# Patient Record
Sex: FEMALE | Race: WHITE | NOT HISPANIC OR LATINO | Employment: OTHER | URBAN - METROPOLITAN AREA
[De-identification: names, ages, dates, MRNs, and addresses within clinical notes are randomized per-mention and may not be internally consistent; named-entity substitution may affect disease eponyms.]

---

## 2024-05-04 ENCOUNTER — HOSPITAL ENCOUNTER (EMERGENCY)
Facility: CLINIC | Age: 39
Discharge: HOME | End: 2024-05-04
Attending: EMERGENCY MEDICINE | Admitting: EMERGENCY MEDICINE
Payer: COMMERCIAL

## 2024-05-04 ENCOUNTER — APPOINTMENT (OUTPATIENT)
Dept: GENERAL RADIOLOGY | Facility: CLINIC | Age: 39
End: 2024-05-04
Attending: EMERGENCY MEDICINE
Payer: COMMERCIAL

## 2024-05-04 VITALS
TEMPERATURE: 97.9 F | OXYGEN SATURATION: 100 % | HEART RATE: 82 BPM | SYSTOLIC BLOOD PRESSURE: 161 MMHG | RESPIRATION RATE: 18 BRPM | DIASTOLIC BLOOD PRESSURE: 101 MMHG

## 2024-05-04 DIAGNOSIS — S61.452A CAT BITE OF LEFT HAND, INITIAL ENCOUNTER: ICD-10-CM
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
## 2024-05-04 PROCEDURE — 96372 THER/PROPH/DIAG INJ SC/IM: CPT | Performed by: EMERGENCY MEDICINE

## 2024-05-04 PROCEDURE — 90471 IMMUNIZATION ADMIN: CPT | Performed by: EMERGENCY MEDICINE

## 2024-05-04 PROCEDURE — 73130 X-RAY EXAM OF HAND: CPT | Mod: RT

## 2024-05-04 PROCEDURE — 73130 X-RAY EXAM OF HAND: CPT | Mod: 26 | Performed by: RADIOLOGY

## 2024-05-04 PROCEDURE — 99284 EMERGENCY DEPT VISIT MOD MDM: CPT | Mod: 25 | Performed by: EMERGENCY MEDICINE

## 2024-05-04 PROCEDURE — 90715 TDAP VACCINE 7 YRS/> IM: CPT | Performed by: EMERGENCY MEDICINE

## 2024-05-04 PROCEDURE — 99284 EMERGENCY DEPT VISIT MOD MDM: CPT | Mod: GC | Performed by: EMERGENCY MEDICINE

## 2024-05-04 PROCEDURE — 250N000011 HC RX IP 250 OP 636: Performed by: EMERGENCY MEDICINE

## 2024-05-04 RX ORDER — IBUPROFEN 600 MG/1
600 TABLET, FILM COATED ORAL EVERY 6 HOURS PRN
Qty: 30 TABLET | Refills: 0 | Status: SHIPPED | OUTPATIENT
Start: 2024-05-04

## 2024-05-04 RX ORDER — KETOROLAC TROMETHAMINE 30 MG/ML
30 INJECTION, SOLUTION INTRAMUSCULAR; INTRAVENOUS ONCE
Status: COMPLETED | OUTPATIENT
Start: 2024-05-04 | End: 2024-05-04

## 2024-05-04 RX ADMIN — KETOROLAC TROMETHAMINE 30 MG: 30 INJECTION, SOLUTION INTRAMUSCULAR at 17:15

## 2024-05-04 RX ADMIN — CLOSTRIDIUM TETANI TOXOID ANTIGEN (FORMALDEHYDE INACTIVATED), CORYNEBACTERIUM DIPHTHERIAE TOXOID ANTIGEN (FORMALDEHYDE INACTIVATED), BORDETELLA PERTUSSIS TOXOID ANTIGEN (GLUTARALDEHYDE INACTIVATED), BORDETELLA PERTUSSIS FILAMENTOUS HEMAGGLUTININ ANTIGEN (FORMALDEHYDE INACTIVATED), BORDETELLA PERTUSSIS PERTACTIN ANTIGEN, AND BORDETELLA PERTUSSIS FIMBRIAE 2/3 ANTIGEN 0.5 ML: 5; 2; 2.5; 5; 3; 5 INJECTION, SUSPENSION INTRAMUSCULAR at 17:18

## 2024-05-04 ASSESSMENT — ACTIVITIES OF DAILY LIVING (ADL)
ADLS_ACUITY_SCORE: 35
ADLS_ACUITY_SCORE: 35

## 2024-05-04 NOTE — DISCHARGE INSTRUCTIONS
TODAY'S VISIT:  You were seen today for cat bite   -   - If you had any labs or imaging/radiology tests performed today, you should also discuss these tests with your usual provider.     FOLLOW-UP:  Please make an appointment to follow up with:  - Your Primary Care Provider. If you do not have a PCP, please call the [facility] (phone: [phone] for an appointment    - Have your provider review the results from today's visit with you again to make sure no further follow-up or additional testing is needed based on those results.     PRESCRIPTIONS / MEDICATIONS:  - Augmentin  - You may take Ibuprofen and/or Tylenol as needed for pain. You can take 600 mg of Ibuprofen every 6 hours and 1 g Tylenol every 6 hours. It may help to alternate these, so you take something every 3 hours.     OTHER INSTRUCTIONS:  -  Applying ice to the affected area several times a day over the next 1-2 days may also help with pain and swelling  - elevate your hand as much as possible    RETURN TO THE EMERGENCY DEPARTMENT  Return to the Emergency Department at any time for any new or worsening symptoms or any concerns.

## 2024-05-04 NOTE — ED PROVIDER NOTES
ED Provider Note  [facility]      History     Chief Complaint   Patient presents with    Cat Bite     HPI  Benita Harris is a 38 year old female with pmhx of depression who presented to the ED on 5/4/2024 with cat bite to left hand/wrist.    Ms. Harris notes that her partner's cat bit her left hand/wrist as she was trying to save it from a pitbull attack. Her partner's cat is a three year old indoor cat and up to date with rabies vaccination. She received her last tetanus booster in 2015.     After the cat bite, she rinsed her hand with water and her partner sprayed it with anesthetic and then she came directly to the ED. Since the cat bite, her left wrist/hand has become swollen and painful. She is able to move all of her fingers, although she is having trouble flexing her thumb and moving her wrist due to the swelling. She is also noticing numbness over the base of her left thumb.     No other injuries reported    She doesn't have a history of bleeding or immunocompromise. She doesn't have any medication allergies.    Past Medical History  No past medical history on file.  No past surgical history on file.  amoxicillin-clavulanate (AUGMENTIN) 875-125 MG tablet  ibuprofen (ADVIL/MOTRIN) 600 MG tablet      No Known Allergies  Family History  No family history on file.  Social History          A medically appropriate review of systems was performed with pertinent positives and negatives noted in the HPI, and all other systems negative.    Physical Exam   BP: (!) 161/101  Pulse: 82  Temp: 97.9  F (36.6  C)  Resp: 18  SpO2: 100 %  Physical Exam  HENT:      Head: Normocephalic and atraumatic.      Nose: Nose normal. No congestion or rhinorrhea.      Mouth/Throat:      Mouth: Mucous membranes are moist.   Eyes:      Extraocular Movements: Extraocular movements intact.      Conjunctiva/sclera: Conjunctivae normal.      Pupils: Pupils are equal, round, and reactive to light.   Cardiovascular:       Rate and Rhythm: Normal rate.   Pulmonary:      Effort: Pulmonary effort is normal. No respiratory distress.   Abdominal:      General: Abdomen is flat.      Palpations: Abdomen is soft.   Musculoskeletal:      Left hand: Swelling (of left wrist with limited ROM due to pain/swelling, left thumb with limited flexion - extension of thumb intact) and laceration (6 punctate cat bite marks across left wrist and base of left thumb) present.      Comments: Distally neurovascularly intact. Minimal active bleeding   Skin:     General: Skin is warm and dry.   Neurological:      Mental Status: She is alert.       ED Course, Procedures, & Data      Procedures              Results for orders placed or performed during the hospital encounter of 05/04/24   XR Hand Port Right G/E 3 Views     Status: None    Narrative    EXAM: XR HAND PORT RIGHT G/E 3 VIEWS  LOCATION: [facility]  DATE: 5/4/2024    INDICATION: swelling, cat bite  COMPARISON: None.      Impression    IMPRESSION: Normal joint spaces and alignment. No fracture. No evidence of osteomyelitis. Soft tissue swelling involving the hand near the first metacarpal and the wrist. No radiopaque foreign body.     Medications   Tdap (tetanus-diphtheria-acell pertussis) (ADACEL) injection 0.5 mL (0.5 mLs Intramuscular $Given 5/4/24 4542)   ketorolac (TORADOL) injection 30 mg (30 mg Intramuscular $Given 5/4/24 4909)     Labs Ordered and Resulted from Time of ED Arrival to Time of ED Departure - No data to display  XR Hand Port Right G/E 3 Views   Final Result   IMPRESSION: Normal joint spaces and alignment. No fracture. No evidence of osteomyelitis. Soft tissue swelling involving the hand near the first metacarpal and the wrist. No radiopaque foreign body.        Assessment & Plan    Benita Harris is a 38 year old female with PMHx of depression who presented to the ED on 5/4/2024 with new cat bite to left hand/wrist.    Exam demonstrates six  punctate bite marks across left wrist and base of thumb with serosanguinous drainage, as well as soft tissue swelling of left wrist and base of left thumb with impaired movement of wrist and impaired flexion of thumb due to pain and swelling (pt is able to flex thumb but not fully). No difficulty with extension of fingers. No bleeding observed. No pus drainage, warmth or erythema of wrist observed. Xray of wrist/hand demonstrates soft tissue swelling without imbedded teeth/other foreign body, fracture, or evidence of osteomyelitis.     Cleansed wounds, updated tetanus booster, treated pain with IV Toradol 15 mg once. Will discharge with 600 mg ibuprofen and 7 day course of Augmentin. Discussed recommendations to return with development of pus drainage, erythema and warmth over wrist. Plan to follow-up with PCP in 7 days.     I have reviewed the nursing notes. I have reviewed the findings, diagnosis, plan and need for follow up with the patient.    New Prescriptions    AMOXICILLIN-CLAVULANATE (AUGMENTIN) 875-125 MG TABLET    Take 1 tablet by mouth 2 times daily for 7 days    IBUPROFEN (ADVIL/MOTRIN) 600 MG TABLET    Take 1 tablet (600 mg) by mouth every 6 hours as needed       Final diagnoses:   Cat bite of left hand, initial encounter     MD Mindy Márquez MD PGY-1  Internal Medicine    --    ED Attending Physician Attestation    I Henrietta Juarez MD, cared for this patient with the Resident. I have performed a history and physical examination of the patient and discussed management with the resident. I reviewed the resident's documentation above and agree with the documented findings and plan of care.    Summary of HPI, PE, ED Course   Patient is a 38 year old female evaluated in the emergency department for cat bite. Exam and ED course notable for XR negative for foreign body or fracture. After the completion of care in the emergency department, the patient was discharged.    Critical  Care & Procedures  Not applicable.        Medical Decision Making  The patient's presentation was of low complexity (an acute and uncomplicated illness or injury).    The patient's evaluation involved:  review of external note(s) from 1 sources ([facility])  ordering and/or review of 1 test(s) in this encounter (XR)  independent interpretation of testing performed by another health professional (XR - agree with radiology reads)    The patient's management necessitated moderate risk (prescription drug management including medications given in the ED) and moderate risk (a decision regarding minor procedure (burn/wound care) with risk factors of none).          Henrietta Juarez MD  Emergency Medicine     [facility] EMERGENCY DEPARTMENT  5/4/2024     Henrietta Juarez MD  05/04/24 4003

## 2024-05-04 NOTE — ED TRIAGE NOTES
"Pt came ambulatory crying and tearful a cat bite on right hand. Feels numbness, pain. \" I think I'm in shock\"  Denied chest pain ,SOB.   BP (!) 161/101   Pulse 82   Temp 97.9  F (36.6  C) (Oral)   Resp 18   SpO2 100%          "

## 2024-05-06 ENCOUNTER — HOSPITAL ENCOUNTER (OUTPATIENT)
Facility: CLINIC | Age: 39
Setting detail: OBSERVATION
Discharge: HOME | End: 2024-05-07
Attending: EMERGENCY MEDICINE | Admitting: FAMILY MEDICINE
Payer: COMMERCIAL

## 2024-05-06 ENCOUNTER — ANESTHESIA (OUTPATIENT)
Dept: MEDSURG UNIT | Facility: CLINIC | Age: 39
End: 2024-05-06
Payer: COMMERCIAL

## 2024-05-06 ENCOUNTER — APPOINTMENT (OUTPATIENT)
Dept: CT IMAGING | Facility: CLINIC | Age: 39
End: 2024-05-06
Attending: EMERGENCY MEDICINE
Payer: COMMERCIAL

## 2024-05-06 ENCOUNTER — ANESTHESIA EVENT (OUTPATIENT)
Dept: MEDSURG UNIT | Facility: CLINIC | Age: 39
End: 2024-05-06
Payer: COMMERCIAL

## 2024-05-06 DIAGNOSIS — S61.451A CAT BITE OF RIGHT HAND, INITIAL ENCOUNTER: ICD-10-CM
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
## 2024-05-06 DIAGNOSIS — M65.949 TENOSYNOVITIS OF HAND: Primary | ICD-10-CM

## 2024-05-06 PROBLEM — F33.42 RECURRENT MAJOR DEPRESSIVE DISORDER, IN FULL REMISSION (H): Status: ACTIVE | Noted: 2020-11-27

## 2024-05-06 PROBLEM — F41.1 GAD (GENERALIZED ANXIETY DISORDER): Status: ACTIVE | Noted: 2020-11-27

## 2024-05-06 PROBLEM — F41.0 PANIC DISORDER: Status: ACTIVE | Noted: 2020-11-27

## 2024-05-06 PROBLEM — A60.04 HERPES SIMPLEX VULVOVAGINITIS: Status: ACTIVE | Noted: 2020-11-27

## 2024-05-06 LAB
ANION GAP SERPL CALCULATED.3IONS-SCNC: 10 MMOL/L (ref 7–15)
BASOPHILS # BLD AUTO: 0 10E3/UL (ref 0–0.2)
BASOPHILS NFR BLD AUTO: 0 %
BUN SERPL-MCNC: 11.2 MG/DL (ref 6–20)
CALCIUM SERPL-MCNC: 8.6 MG/DL (ref 8.6–10)
CHLORIDE SERPL-SCNC: 104 MMOL/L (ref 98–107)
CREAT SERPL-MCNC: 0.57 MG/DL (ref 0.51–0.95)
CRP SERPL-MCNC: 36.9 MG/L
DEPRECATED HCO3 PLAS-SCNC: 22 MMOL/L (ref 22–29)
EGFRCR SERPLBLD CKD-EPI 2021: >90 ML/MIN/1.73M2
EOSINOPHIL # BLD AUTO: 0.1 10E3/UL (ref 0–0.7)
EOSINOPHIL NFR BLD AUTO: 2 %
ERYTHROCYTE [DISTWIDTH] IN BLOOD BY AUTOMATED COUNT: 13 % (ref 10–15)
ERYTHROCYTE [SEDIMENTATION RATE] IN BLOOD BY WESTERGREN METHOD: 9 MM/HR (ref 0–20)
GLUCOSE SERPL-MCNC: 95 MG/DL (ref 70–99)
HCG UR QL: NEGATIVE
HCT VFR BLD AUTO: 41.3 % (ref 35–47)
HGB BLD-MCNC: 13.7 G/DL (ref 11.7–15.7)
IMM GRANULOCYTES # BLD: 0 10E3/UL
IMM GRANULOCYTES NFR BLD: 0 %
INR PPP: 1.02 (ref 0.85–1.15)
LACTATE SERPL-SCNC: 0.6 MMOL/L (ref 0.7–2)
LYMPHOCYTES # BLD AUTO: 1.5 10E3/UL (ref 0.8–5.3)
LYMPHOCYTES NFR BLD AUTO: 16 %
MCH RBC QN AUTO: 28.8 PG (ref 26.5–33)
MCHC RBC AUTO-ENTMCNC: 33.2 G/DL (ref 31.5–36.5)
MCV RBC AUTO: 87 FL (ref 78–100)
MONOCYTES # BLD AUTO: 0.7 10E3/UL (ref 0–1.3)
MONOCYTES NFR BLD AUTO: 8 %
NEUTROPHILS # BLD AUTO: 7.2 10E3/UL (ref 1.6–8.3)
NEUTROPHILS NFR BLD AUTO: 74 %
NRBC # BLD AUTO: 0 10E3/UL
NRBC BLD AUTO-RTO: 0 /100
PLATELET # BLD AUTO: 229 10E3/UL (ref 150–450)
POTASSIUM SERPL-SCNC: 3.8 MMOL/L (ref 3.4–5.3)
RBC # BLD AUTO: 4.76 10E6/UL (ref 3.8–5.2)
SODIUM SERPL-SCNC: 136 MMOL/L (ref 135–145)
WBC # BLD AUTO: 9.6 10E3/UL (ref 4–11)

## 2024-05-06 PROCEDURE — 250N000013 HC RX MED GY IP 250 OP 250 PS 637: Performed by: PHYSICIAN ASSISTANT

## 2024-05-06 PROCEDURE — 250N000011 HC RX IP 250 OP 636: Performed by: EMERGENCY MEDICINE

## 2024-05-06 PROCEDURE — 96368 THER/DIAG CONCURRENT INF: CPT | Performed by: EMERGENCY MEDICINE

## 2024-05-06 PROCEDURE — 73201 CT UPPER EXTREMITY W/DYE: CPT | Mod: 26 | Performed by: RADIOLOGY

## 2024-05-06 PROCEDURE — G0378 HOSPITAL OBSERVATION PER HR: HCPCS

## 2024-05-06 PROCEDURE — 96365 THER/PROPH/DIAG IV INF INIT: CPT | Mod: 59 | Performed by: EMERGENCY MEDICINE

## 2024-05-06 PROCEDURE — 85025 COMPLETE CBC W/AUTO DIFF WBC: CPT | Performed by: EMERGENCY MEDICINE

## 2024-05-06 PROCEDURE — 99222 1ST HOSP IP/OBS MODERATE 55: CPT | Mod: AI

## 2024-05-06 PROCEDURE — 87040 BLOOD CULTURE FOR BACTERIA: CPT | Performed by: EMERGENCY MEDICINE

## 2024-05-06 PROCEDURE — 96361 HYDRATE IV INFUSION ADD-ON: CPT

## 2024-05-06 PROCEDURE — 250N000013 HC RX MED GY IP 250 OP 250 PS 637: Performed by: EMERGENCY MEDICINE

## 2024-05-06 PROCEDURE — 81025 URINE PREGNANCY TEST: CPT | Performed by: EMERGENCY MEDICINE

## 2024-05-06 PROCEDURE — 36415 COLL VENOUS BLD VENIPUNCTURE: CPT | Performed by: STUDENT IN AN ORGANIZED HEALTH CARE EDUCATION/TRAINING PROGRAM

## 2024-05-06 PROCEDURE — 99285 EMERGENCY DEPT VISIT HI MDM: CPT | Mod: 25 | Performed by: EMERGENCY MEDICINE

## 2024-05-06 PROCEDURE — 87040 BLOOD CULTURE FOR BACTERIA: CPT | Performed by: PHYSICIAN ASSISTANT

## 2024-05-06 PROCEDURE — 99207 PR APP CREDIT; MD BILLING SHARED VISIT: CPT | Mod: FS | Performed by: STUDENT IN AN ORGANIZED HEALTH CARE EDUCATION/TRAINING PROGRAM

## 2024-05-06 PROCEDURE — 250N000011 HC RX IP 250 OP 636: Performed by: STUDENT IN AN ORGANIZED HEALTH CARE EDUCATION/TRAINING PROGRAM

## 2024-05-06 PROCEDURE — 99291 CRITICAL CARE FIRST HOUR: CPT | Performed by: EMERGENCY MEDICINE

## 2024-05-06 PROCEDURE — 36415 COLL VENOUS BLD VENIPUNCTURE: CPT | Performed by: PHYSICIAN ASSISTANT

## 2024-05-06 PROCEDURE — 36415 COLL VENOUS BLD VENIPUNCTURE: CPT | Performed by: EMERGENCY MEDICINE

## 2024-05-06 PROCEDURE — 96375 TX/PRO/DX INJ NEW DRUG ADDON: CPT

## 2024-05-06 PROCEDURE — 86140 C-REACTIVE PROTEIN: CPT | Performed by: EMERGENCY MEDICINE

## 2024-05-06 PROCEDURE — 258N000003 HC RX IP 258 OP 636: Performed by: EMERGENCY MEDICINE

## 2024-05-06 PROCEDURE — 96376 TX/PRO/DX INJ SAME DRUG ADON: CPT | Mod: 59

## 2024-05-06 PROCEDURE — 85652 RBC SED RATE AUTOMATED: CPT | Performed by: EMERGENCY MEDICINE

## 2024-05-06 PROCEDURE — 83605 ASSAY OF LACTIC ACID: CPT | Performed by: EMERGENCY MEDICINE

## 2024-05-06 PROCEDURE — 80048 BASIC METABOLIC PNL TOTAL CA: CPT | Performed by: EMERGENCY MEDICINE

## 2024-05-06 PROCEDURE — 99223 1ST HOSP IP/OBS HIGH 75: CPT | Mod: FS | Performed by: PHYSICIAN ASSISTANT

## 2024-05-06 PROCEDURE — 96376 TX/PRO/DX INJ SAME DRUG ADON: CPT

## 2024-05-06 PROCEDURE — 96366 THER/PROPH/DIAG IV INF ADDON: CPT | Performed by: EMERGENCY MEDICINE

## 2024-05-06 PROCEDURE — 85610 PROTHROMBIN TIME: CPT | Performed by: STUDENT IN AN ORGANIZED HEALTH CARE EDUCATION/TRAINING PROGRAM

## 2024-05-06 PROCEDURE — 73201 CT UPPER EXTREMITY W/DYE: CPT | Mod: RT

## 2024-05-06 PROCEDURE — 250N000011 HC RX IP 250 OP 636: Performed by: PHYSICIAN ASSISTANT

## 2024-05-06 RX ORDER — PIPERACILLIN SODIUM, TAZOBACTAM SODIUM 3; .375 G/15ML; G/15ML
3.38 INJECTION, POWDER, LYOPHILIZED, FOR SOLUTION INTRAVENOUS EVERY 6 HOURS
Status: DISCONTINUED | OUTPATIENT
Start: 2024-05-06 | End: 2024-05-07

## 2024-05-06 RX ORDER — DIPHENHYDRAMINE HCL 25 MG
25 CAPSULE ORAL EVERY 4 HOURS PRN
Status: DISCONTINUED | OUTPATIENT
Start: 2024-05-06 | End: 2024-05-07 | Stop reason: HOSPADM

## 2024-05-06 RX ORDER — CEFAZOLIN SODIUM 2 G/100ML
2 INJECTION, SOLUTION INTRAVENOUS SEE ADMIN INSTRUCTIONS
Status: CANCELLED | OUTPATIENT
Start: 2024-05-06

## 2024-05-06 RX ORDER — KETOROLAC TROMETHAMINE 30 MG/ML
30 INJECTION, SOLUTION INTRAMUSCULAR; INTRAVENOUS EVERY 6 HOURS PRN
Status: DISCONTINUED | OUTPATIENT
Start: 2024-05-06 | End: 2024-05-07

## 2024-05-06 RX ORDER — IOPAMIDOL 755 MG/ML
86 INJECTION, SOLUTION INTRAVASCULAR ONCE
Status: COMPLETED | OUTPATIENT
Start: 2024-05-06 | End: 2024-05-06

## 2024-05-06 RX ORDER — CEFAZOLIN SODIUM 2 G/100ML
2 INJECTION, SOLUTION INTRAVENOUS
Status: CANCELLED | OUTPATIENT
Start: 2024-05-06

## 2024-05-06 RX ORDER — CLINDAMYCIN PHOSPHATE 900 MG/50ML
900 INJECTION, SOLUTION INTRAVENOUS EVERY 8 HOURS
Status: DISCONTINUED | OUTPATIENT
Start: 2024-05-06 | End: 2024-05-06

## 2024-05-06 RX ORDER — ACETAMINOPHEN 325 MG/1
975 TABLET ORAL EVERY 6 HOURS PRN
Status: DISCONTINUED | OUTPATIENT
Start: 2024-05-06 | End: 2024-05-07 | Stop reason: HOSPADM

## 2024-05-06 RX ORDER — DIPHENHYDRAMINE HYDROCHLORIDE 50 MG/ML
25 INJECTION INTRAMUSCULAR; INTRAVENOUS ONCE
Status: COMPLETED | OUTPATIENT
Start: 2024-05-06 | End: 2024-05-06

## 2024-05-06 RX ORDER — OXYCODONE HYDROCHLORIDE 5 MG/1
5 TABLET ORAL EVERY 6 HOURS PRN
Status: DISCONTINUED | OUTPATIENT
Start: 2024-05-06 | End: 2024-05-07 | Stop reason: HOSPADM

## 2024-05-06 RX ORDER — PIPERACILLIN SODIUM, TAZOBACTAM SODIUM 3; .375 G/15ML; G/15ML
3.38 INJECTION, POWDER, LYOPHILIZED, FOR SOLUTION INTRAVENOUS ONCE
Status: COMPLETED | OUTPATIENT
Start: 2024-05-06 | End: 2024-05-06

## 2024-05-06 RX ORDER — CLINDAMYCIN PHOSPHATE 600 MG/50ML
600 INJECTION, SOLUTION INTRAVENOUS EVERY 8 HOURS
Status: DISCONTINUED | OUTPATIENT
Start: 2024-05-06 | End: 2024-05-06

## 2024-05-06 RX ORDER — LIDOCAINE 40 MG/G
CREAM TOPICAL
Status: DISCONTINUED | OUTPATIENT
Start: 2024-05-06 | End: 2024-05-07 | Stop reason: HOSPADM

## 2024-05-06 RX ORDER — OXYCODONE HYDROCHLORIDE 5 MG/1
5 TABLET ORAL
Status: DISCONTINUED | OUTPATIENT
Start: 2024-05-06 | End: 2024-05-06

## 2024-05-06 RX ADMIN — OXYCODONE HYDROCHLORIDE 5 MG: 5 TABLET ORAL at 17:00

## 2024-05-06 RX ADMIN — PIPERACILLIN AND TAZOBACTAM 3.38 G: 3; .375 INJECTION, POWDER, LYOPHILIZED, FOR SOLUTION INTRAVENOUS at 18:27

## 2024-05-06 RX ADMIN — VANCOMYCIN HYDROCHLORIDE 1500 MG: 10 INJECTION, POWDER, LYOPHILIZED, FOR SOLUTION INTRAVENOUS at 13:47

## 2024-05-06 RX ADMIN — SODIUM CHLORIDE 1000 ML: 9 INJECTION, SOLUTION INTRAVENOUS at 12:54

## 2024-05-06 RX ADMIN — IOPAMIDOL 86 ML: 755 INJECTION, SOLUTION INTRAVENOUS at 15:40

## 2024-05-06 RX ADMIN — OXYCODONE HYDROCHLORIDE 5 MG: 5 TABLET ORAL at 23:06

## 2024-05-06 RX ADMIN — PIPERACILLIN SODIUM AND TAZOBACTAM SODIUM 3.38 G: 3; .375 INJECTION, POWDER, LYOPHILIZED, FOR SOLUTION INTRAVENOUS at 12:48

## 2024-05-06 RX ADMIN — DIPHENHYDRAMINE HYDROCHLORIDE 25 MG: 50 INJECTION, SOLUTION INTRAMUSCULAR; INTRAVENOUS at 14:48

## 2024-05-06 RX ADMIN — OXYCODONE HYDROCHLORIDE 5 MG: 5 TABLET ORAL at 12:48

## 2024-05-06 ASSESSMENT — ACTIVITIES OF DAILY LIVING (ADL)
ADLS_ACUITY_SCORE: 35
ADLS_ACUITY_SCORE: 21
ADLS_ACUITY_SCORE: 35
ADLS_ACUITY_SCORE: 21
ADLS_ACUITY_SCORE: 35

## 2024-05-06 ASSESSMENT — COLUMBIA-SUICIDE SEVERITY RATING SCALE - C-SSRS
2. HAVE YOU ACTUALLY HAD ANY THOUGHTS OF KILLING YOURSELF IN THE PAST MONTH?: NO
6. HAVE YOU EVER DONE ANYTHING, STARTED TO DO ANYTHING, OR PREPARED TO DO ANYTHING TO END YOUR LIFE?: NO
1. IN THE PAST MONTH, HAVE YOU WISHED YOU WERE DEAD OR WISHED YOU COULD GO TO SLEEP AND NOT WAKE UP?: NO

## 2024-05-06 NOTE — CONSULTS
Orthopaedic Surgery Consultation    DATE OF SERVICE:  5/6/2024    This is a consultation requested by ED for concern for cellulitis R hand    CHIEF COMPLAINT:  R hand pain    HISTORY OBTAINED FROM: patient    HISTORY:  This is a 38 year old RHD female with PMH SP and MDD who presents with right hand pain following a cat bite.  Patient reports she was at the outdoor area of her apartment taking her friend's cat outside when 2 dogs attacked their cat and she tried to separate them.  She states that she was bit by the cat twice on the right hand and was not bit by the dogs. Discharged with 7 days augmentin and given tetanus.  Reports she has been taking her Augmentin as directed.  reports she continues to have right hand pain which is worsening, she reports that her swelling has worsened on the dorsum of her hand and wrist.  She reports that 3 of the cat bite puncture wounds have been expressing purulence, however have since scabbed over.  Reports she is now having some numbness to the right thenar eminence which is gradually worsened.    Reports her pain is mostly in the right thenar eminence as well as the dorsum of the right hand.    N.p.o. at 9 AM this morning.  Last drink 4 ounces water at 4:45 PM    Patients denies fever, chills, nausea, vomiting, changes in bowel/bladder habits. Denies upper respiratory symptoms, urinary symptoms, rashes.      PAST MEDICAL HISTORY:    No past medical history on file.    PAST SURGICAL HISTORY:    No past surgical history on file.    FAMILY HISTORY:  Reviewed with patient and is non-contributory.     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 Auth Provider Long Term End Date   amoxicillin-clavulanate (AUGMENTIN) 875-125 MG tablet Take 1 tablet by mouth 2 times daily for 7 days  "  Henrietta Juarez MD  5/11/24   ibuprofen (ADVIL/MOTRIN) 600 MG tablet Take 1 tablet (600 mg) by mouth every 6 hours as needed   Henrietta Juarez MD           REVIEW OF SYSTEMS:  An 11-point systems review was performed and negative except as noted in the HPI.      PHYSICAL EXAMINATION:    Vitals:    05/06/24 1211 05/06/24 1317   BP: 119/78    Pulse: 68    Resp: 16    Temp: 98.3  F (36.8  C)    TempSrc: Oral    SpO2: 99%    Weight:  63.5 kg (140 lb)   Height:  1.753 m (5' 9\")         Gen:  Awake and Alert, pleasant, interactive, no acute distress.    Psych:  Articulates and Communicates with a normal affect    HEENT:  Normal and atraumatic    Pulm:   Non-labored breathing at rest. Saturating well on RA.   CV:  RRR   Pelvis:   Stable to anterior and lateral compression  Extremities:    RUE:   No deformity, skin intact.  There are multiple scattered puncture wounds on the dorsum of the hand and wrist as well as forearm and the thenar eminence.  There is very slight erythema extending from the dorsum of the hand into the distal forearm.  Swelling extending from the dorsum of the hand and thenar eminence into the dorsal forearm.  Tender to palpation over the dorsum of the hand, thenar eminence, and less so over the dorsal forearm.  Pain with passive stretch of the index and middle fingers over the extensor tendons.  No active drainage at this time.  Fires FPL, EPL, EDC, FDS, FDP, IO.  Wrist range of motion is limited to 10 degrees of flexion 10 degrees of extension.     Non-tender to palpation over clavicle, shoulder, arm, elbow.    Normal ROM shoulder, elbow without pain. + FPL/EPL/intrinsics.     SILT over m/r/u distributions, though with focal decreased sensation over the thenar eminence.    Radial pulse palpable.     LUE:  No deformity, skin intact.     Non-tender to palpation over clavicle, shoulder, arm, elbow, forearm, wrist.     Normal ROM shoulder, elbow, wrist without pain. + FPL/EPL/intrinsics. " "    SILT over m/r/u distributions.     Radial pulse palpable.                       LABS/IMAGING:  Recent Labs   Lab Test 05/06/24  1240   HGB 13.7   WBC 9.6     Recent Labs   Lab Test 05/06/24  1240   CHLORIDE 104   CO2 22   BUN 11.2     No results for input(s): \"INR\", \"PROTIME\", \"PTT\" in the last 73200 hours.    Imaging:  Right wrist radiographs obtained on 5/6/2024 demonstrate no fracture or dislocation, no foreign body.  Soft tissue swelling present.    CT of the right hand obtained on 5/6/2024 demonstrates increased fluid present within the second and third extensor compartments.  Subcutaneous tissue swelling the dorsum of the hand and wrist.      IMPRESSION AND PLAN:  A 38 year old right-hand-dominant female with concern for right septic extensor tenosynovitis. This would be best treated with irrigation and debridement of the right upper extremity given the extent of involvement of the extensor tendon sheath and acute worsening despite antibiotics.     - Plan for OR: Irrigation and debridement of the right extremity.   - consent: pending   - preop labs: pending   - medical clearance: pending  - Anticoagulation: Hold after 8 PM  - Antibiotics/tetanus: Already receiving Vanco Zosyn.  Per primary.  - Xrays/imaging: No new imaging required  - Activity: As tolerated  - Weight Bearing: WBAT right upper extremity  - Pain control: per primary  - Diet: N.p.o. at MN   - Dispo: TBD  - Follow-up: TBD    Patient was discussed with on-call staff: Dr. Sutherland.    Jennifer Curry MD  Orthopaedic Surgery, PGY1   "

## 2024-05-06 NOTE — PLAN OF CARE
"  VS: /78 (BP Location: Left arm)   Pulse 72   Temp 98.2  F (36.8  C) (Oral)   Resp 18   Ht 1.753 m (5' 9\")   Wt 63.5 kg (140 lb)   SpO2 100%   BMI 20.67 kg/m       O2: Stable @ RA ,Denies SOB,Chest pain   Output: Continent of b/b   Activity: Independent in room   Skin: Bite marks/Swelling on R.arm   Pain: Pain on R.arm   CMS: AXOX4   Dressing: None   Diet: Regular diet,NPO to start at midnight for procedure tomorrow morning   LDA: LPIV infusing Abx   Equipment: Personal belongings   Plan: Continue with POC   Additional Info: NPO starting at midnight  CHG bath tonight and tomorrow morning  R.arm elevated on a pillow                             "

## 2024-05-06 NOTE — PLAN OF CARE
8MS ADMISSION    Patient admitted/transferred from [facility] via EMS for R.hand Cat bite @5:30pm  Upon arrival to the unit patient was oriented to room, unit, and call light. Patient s vital signs were obtained. Allergies reviewed and allergy band applied. Provider notified of patient s arrival on the unit. Adult AVS completed. Head to toe assessment completed. Education assessment completed. Care plan initiated.  Vital signs stable upon admission. Patient rates pain at 5 . Two person skin check completed with Nurse Angel.No Significant Skin Findings .   Plan:  Continue with plan of care. Notify provider with any concerns or changes in patient status.

## 2024-05-06 NOTE — ED NOTES
Patient called to alert staff that she has been experiencing continuous body itch . Noted since Vancomycin was started. Slight redness noted to  face, arms and head. Writer stops Vancomycin. Alerted Dr Thomas and pharmacist. Order received for benadryl 25 mg IV once  for itching and to restart vancomycin half of initial rate.. per pharmacy to start Vancomycin rate  100 ml/hr. No rasjh noted on b

## 2024-05-06 NOTE — ED NOTES
Transferred to UR 8 MS , report given to AVILA Hunt. Transported by [facility] ambulance

## 2024-05-06 NOTE — ED PROVIDER NOTES
"    [place] EMERGENCY DEPARTMENT ([facility])    5/06/24       ED PROVIDER NOTE     History     Chief Complaint   Patient presents with    Cat Bite     HPI  Benita Harris is a 38 year old female with history of anxiety who presents to the emergency department with concerns for worsening infection to a cat bite.  She was seen in the emergency department on 5/4/2024, 2 days ago, after sustaining a cat bite to her right hand.  She was seen by Dr. Boston who performed a portable x-ray of the right hand that was negative for radiopaque foreign bodies.  It did note some soft tissue swelling near the first metacarpal and the wrist.  She was given a dose of Toradol, tetanus booster and a prescription for Augmentin and ibuprofen.  She presents today with numbness and pain to her right hand and is concerned of worsening infection. Patient states her hand has progressively became more red and swollen. She is now noticing numbness under her right thumb. She denies recent fevers. She states her pain is much worse. She has noticed pus from the the bite wounds. She is right hand dominant.       Past Medical History  No past medical history on file.  No past surgical history on file.  amoxicillin-clavulanate (AUGMENTIN) 875-125 MG tablet  ibuprofen (ADVIL/MOTRIN) 600 MG tablet      No Known Allergies  Family History  No family history on file.  Social History          A medically appropriate review of systems was performed with pertinent positives and negatives noted in the HPI, and all other systems negative.    Physical Exam   BP: 119/78  Pulse: 68  Temp: 98.3  F (36.8  C)  Resp: 16  Height: 175.3 cm (5' 9\")  Weight: 63.5 kg (140 lb)  SpO2: 99 %  Physical Exam  Vitals and nursing note reviewed.   Constitutional:       General: She is not in acute distress.     Appearance: Normal appearance. She is well-developed. She is not ill-appearing or diaphoretic.   HENT:      Head: Normocephalic and atraumatic.      Nose: Nose " normal.      Mouth/Throat:      Mouth: Mucous membranes are moist.   Eyes:      General: No scleral icterus.     Conjunctiva/sclera: Conjunctivae normal.   Cardiovascular:      Rate and Rhythm: Normal rate.   Pulmonary:      Effort: Pulmonary effort is normal. No respiratory distress.      Breath sounds: No stridor.   Abdominal:      General: There is no distension.   Musculoskeletal:         General: Swelling and tenderness present. No deformity. Normal range of motion.      Right hand: Swelling and tenderness present. Normal capillary refill. Normal pulse.        Arms:       Cervical back: Normal range of motion and neck supple. No rigidity.      Comments: Significant soft tissue swelling over dorsum of right hand with faint erythema. Tender to palpation and pain with wrist movement. No palpable crepitus. Multiple crusted puncture wounds. No drainage.    Skin:     General: Skin is warm and dry.      Coloration: Skin is not jaundiced or pale.      Findings: Erythema present.   Neurological:      General: No focal deficit present.      Mental Status: She is alert and oriented to person, place, and time.   Psychiatric:         Mood and Affect: Mood normal.         Behavior: Behavior normal.              ED Course, Procedures, & Data      Procedures               Results for orders placed or performed during the hospital encounter of 05/06/24   Basic metabolic panel     Status: Normal   Result Value Ref Range    Sodium 136 135 - 145 mmol/L    Potassium 3.8 3.4 - 5.3 mmol/L    Chloride 104 98 - 107 mmol/L    Carbon Dioxide (CO2) 22 22 - 29 mmol/L    Anion Gap 10 7 - 15 mmol/L    Urea Nitrogen 11.2 6.0 - 20.0 mg/dL    Creatinine 0.57 0.51 - 0.95 mg/dL    GFR Estimate >90 >60 mL/min/1.73m2    Calcium 8.6 8.6 - 10.0 mg/dL    Glucose 95 70 - 99 mg/dL   Lactic acid whole blood     Status: Abnormal   Result Value Ref Range    Lactic Acid 0.6 (L) 0.7 - 2.0 mmol/L   CRP inflammation     Status: Abnormal   Result Value Ref  Range    CRP Inflammation 36.90 (H) <5.00 mg/L   Erythrocyte sedimentation rate auto     Status: Normal   Result Value Ref Range    Erythrocyte Sedimentation Rate 9 0 - 20 mm/hr   HCG qualitative urine (UPT)     Status: Normal   Result Value Ref Range    hCG Urine Qualitative Negative Negative   CBC with platelets and differential     Status: None   Result Value Ref Range    WBC Count 9.6 4.0 - 11.0 10e3/uL    RBC Count 4.76 3.80 - 5.20 10e6/uL    Hemoglobin 13.7 11.7 - 15.7 g/dL    Hematocrit 41.3 35.0 - 47.0 %    MCV 87 78 - 100 fL    MCH 28.8 26.5 - 33.0 pg    MCHC 33.2 31.5 - 36.5 g/dL    RDW 13.0 10.0 - 15.0 %    Platelet Count 229 150 - 450 10e3/uL    % Neutrophils 74 %    % Lymphocytes 16 %    % Monocytes 8 %    % Eosinophils 2 %    % Basophils 0 %    % Immature Granulocytes 0 %    NRBCs per 100 WBC 0 <1 /100    Absolute Neutrophils 7.2 1.6 - 8.3 10e3/uL    Absolute Lymphocytes 1.5 0.8 - 5.3 10e3/uL    Absolute Monocytes 0.7 0.0 - 1.3 10e3/uL    Absolute Eosinophils 0.1 0.0 - 0.7 10e3/uL    Absolute Basophils 0.0 0.0 - 0.2 10e3/uL    Absolute Immature Granulocytes 0.0 <=0.4 10e3/uL    Absolute NRBCs 0.0 10e3/uL   CBC with platelets differential     Status: None    Narrative    The following orders were created for panel order CBC with platelets differential.  Procedure                               Abnormality         Status                     ---------                               -----------         ------                     CBC with platelets and d...[[id number]]                      Final result                 Please view results for these tests on the individual orders.     Medications   oxyCODONE (ROXICODONE) tablet 5 mg (5 mg Oral $Given 5/6/24 1248)   vancomycin (VANCOCIN) 1,500 mg in 0.9% NaCl 250 mL intermittent infusion (1,500 mg Intravenous $New Bag 5/6/24 1347)   lidocaine 1 % 10 mL (has no administration in time range)   vancomycin (VANCOCIN) 1,250 mg in 0.9% NaCl 250 mL intermittent infusion  (has no administration in time range)   pharmacy alert - intermittent dosing (has no administration in time range)   lidocaine 1 % 0.1-1 mL (has no administration in time range)   lidocaine (LMX4) cream (has no administration in time range)   sodium chloride (PF) 0.9% PF flush 3 mL (3 mLs Intracatheter $Given 5/6/24 1449)   sodium chloride (PF) 0.9% PF flush 3 mL (3 mLs Intracatheter $Given 5/6/24 1448)   clindamycin (CLEOCIN) 900 mg in 50 mL D5W intermittent infusion (has no administration in time range)   piperacillin-tazobactam (ZOSYN) 3.375 g vial to attach to  mL bag (0 g Intravenous Stopped 5/6/24 1448)   sodium chloride 0.9% BOLUS 1,000 mL (1,000 mLs Intravenous $New Bag 5/6/24 1254)   diphenhydrAMINE (BENADRYL) injection 25 mg (25 mg Intravenous $Given 5/6/24 1448)     Labs Ordered and Resulted from Time of ED Arrival to Time of ED Departure   LACTIC ACID WHOLE BLOOD - Abnormal       Result Value    Lactic Acid 0.6 (*)    CRP INFLAMMATION - Abnormal    CRP Inflammation 36.90 (*)    BASIC METABOLIC PANEL - Normal    Sodium 136      Potassium 3.8      Chloride 104      Carbon Dioxide (CO2) 22      Anion Gap 10      Urea Nitrogen 11.2      Creatinine 0.57      GFR Estimate >90      Calcium 8.6      Glucose 95     ERYTHROCYTE SEDIMENTATION RATE AUTO - Normal    Erythrocyte Sedimentation Rate 9     HCG QUALITATIVE URINE - Normal    hCG Urine Qualitative Negative     CBC WITH PLATELETS AND DIFFERENTIAL    WBC Count 9.6      RBC Count 4.76      Hemoglobin 13.7      Hematocrit 41.3      MCV 87      MCH 28.8      MCHC 33.2      RDW 13.0      Platelet Count 229      % Neutrophils 74      % Lymphocytes 16      % Monocytes 8      % Eosinophils 2      % Basophils 0      % Immature Granulocytes 0      NRBCs per 100 WBC 0      Absolute Neutrophils 7.2      Absolute Lymphocytes 1.5      Absolute Monocytes 0.7      Absolute Eosinophils 0.1      Absolute Basophils 0.0      Absolute Immature Granulocytes 0.0       Absolute NRBCs 0.0     BLOOD CULTURE     CT Hand Right w Contrast    (Results Pending)          Critical care was performed.   Critical Care Addendum  My initial assessment, based on my review of nursing observations, review of vital signs, focused history, and physical exam, established a high suspicion that Benita Harris has sepsis with indication for early goal-directed therapy and rapidly spreading infected cat bite , which requires immediate intervention, and therefore she is critically ill.     After the initial assessment, the care team initiated multiple lab tests, initiated IV fluid administration, and consulted with ortho  to provide stabilization care. Due to the critical nature of this patient, I reassessed nursing observations, vital signs, physical exam, and neurologic status multiple times prior to her disposition.     Time also spent performing documentation, reviewing test results, discussion with consultants, and coordination of care.     Critical care time (excluding teaching time and procedures): 38 minutes.     Assessment & Plan    Benita Harris is a 38 year old female with history of anxiety who presents to the emergency department with concerns for worsening infection to a cat bite.       Ddx: infected cat bite, failure of outpatient wound ppx, deep space soft tissue infection    Patient with marked worsening of dorsal right hand swelling despite rx with augmentin. Cat is her own pet. Patient's xray reviewed and no FB noted. TDAP utd.     Obtain CT and cultures. Admit for vanc and zosyn. Given oxy for pain.     CRP 36.9. lactate nl. ESR nl. CBC nl.    2:50 PM Patient started getting itching on her scalp during vanc infusion. No rash. Will give IV benadryl and slow down infusion. Patient also noting proximal extension of redness, pain, and swelling up to mid shaft forearm since initial evaluation today. Wound margins marked. Added IV clindamycin. Ortho consulted and planning to do some bedside I&D  of bite wounds.      Hand off provided to medicine obs. Plan for transfer to [facility].    Patient signed out to Dr. Quinonez at shift change. Dispo pending Ortho consult and CT. Anticipate admission to [facility]. Please see addendum for results of work up and remaining management.          I have reviewed the nursing notes. I have reviewed the findings, diagnosis, plan and need for follow up with the patient.    New Prescriptions    No medications on file       Final diagnoses:   Cat bite of right hand, initial encounter   I, PETERSON PRABHAKAR, am serving as a trained medical scribe to document services personally performed by Elizabeth Thomas MD, based on the provider's statements to me.     IElizabeth MD, was physically present and have reviewed and verified the accuracy of this note documented by PETERSON PRABHAKAR.    Elizabeth Thomas MD   [facility] EMERGENCY DEPARTMENT  5/6/2024        Elizabeth Thomas MD  05/06/24 5947

## 2024-05-06 NOTE — H&P
[facility]    History and Physical - Hospitalist Service, GOLD TEAM        Date of Admission:  5/6/2024    Assessment & Plan      Benita Harris is a 38 year old female admitted on 5/6/2024. She has no significant past medical history. She was bit by her cat on 5/4/24 while rescuing it from a dog attack. Seen in ED at that time with XR notable for some swelling at first metacarpal and wrist, no radiopaque foreign body. Discharged on augmentin. Has had worsening swelling, pain and now some associated numbness and firmness at puncture site concerning for developing abscess with risk for joint infection. Admitted for IV abx and Orthopedics evaluation.     Patient being admitted on [place] with transfer to [facility] to [place].     Soft tissue infection secondary to cat bite  Initially on augmentin with apparent treatment failure and progression of swelling, pain. Developed some purulent drainage and now has some numbness to her thenar eminence of right hand with swelling progressing into her hand. Tetanus was updated in ED on 5/4. Mildly elevated CRP, WBC and lactic wnl. No current systemic symptoms. Started on vancomycin and zosyn in the ED. Did develop itching with vanco - infusion slowed and received IV benadryl per ED provider.   - Orthopedics consulted   - Continue vancomcyin, zosyn 3.375g q6h  - Stop clindamycin   - Oral benadryl for pre-med/itching with further vanco administrations   - CT wrist w/contrast   - Pending blood culture x2  - Trend CBC, CRP  - Pain: tylenol, toradol, oxycodone PRN        Diet: Combination Diet Regular Diet Adult  DVT Prophylaxis: Low Risk/Ambulatory with no VTE prophylaxis indicated  Velez Catheter: Not present  Lines: None     Cardiac Monitoring: None  Code Status: Full Code    Clinically Significant Risk Factors Present on Admission                                  Disposition Plan     Medically Ready for Discharge: Anticipated  in 2-4 Days         The patient's care was discussed with the Attending Physician, Dr. Santiago and Patient.    Linda Kerr PA-C  Hospitalist Service, [facility]  Securely message with [employer] (more info)  Text page via [facility] Paging/Directory   See signed in provider for up to date coverage information    ______________________________________________________________________    Chief Complaint   Cat bite    History is obtained from the patient    History of Present Illness   Benita Harris is a 38 year old female who presented to the ED with a cat bit she got on Saturday. Had been outside with her cat in a pet area. Neighbor came in with 2 unleashed dogs and allowed them into the area and they went to attack her cat. She pulled the cat away from the dogs and cat bit her. She was seen in our ED on Saturday and discharged on augmentin. Developed worsening swelling yesterday into today. Has developed some numbness around her thumb. Swelling leading to decreased flexion/extension of her wrist. Has some firmness near the puncture site. Did have some purulence yesterday. Denies any fevers, chills. Otherwise in usual state of health. No significant past medical history. No known drug allergies. Did get itchy with vancomycin administration - rate of fusion slowed and given IV benadryl. Did note dislike for feeling of IV benadryl. Pain well controlled with orals.       Past Medical History    No past medical history on file.    Past Surgical History   No past surgical history on file.    Prior to Admission Medications   Prior to Admission Medications   Prescriptions Last Dose Informant Patient Reported? Taking?   amoxicillin-clavulanate (AUGMENTIN) 875-125 MG tablet   No No   Sig: Take 1 tablet by mouth 2 times daily for 7 days   ibuprofen (ADVIL/MOTRIN) 600 MG tablet   No No   Sig: Take 1 tablet (600 mg) by mouth every 6 hours as needed      Facility-Administered  Medications: None        Review of Systems    The 10 point Review of Systems is negative other than noted in the HPI or here.      Physical Exam   Vital Signs: Temp: 98.3  F (36.8  C) Temp src: Oral BP: 119/78 Pulse: 68   Resp: 16 SpO2: 99 % O2 Device: None (Room air)    Weight: 140 lbs 0 oz    General Appearance: Awake. Alert and oriented x4. Sitting up in bed. No acute distress.   Eyes: Normal lids. Anicteric sclera. Pupils equal and round.   HEENT: Normocephalic, atraumatic. Nares patent. Mucous membranes moist.   Respiratory: Normal work of breathing on room air. Lungs CTAB. No wheezes.   Cardiovascular: RRR. S1, S2. No murmurs.   GI: Abdomen non-distended. Normoactive. Soft, non-tender.   Lymph/Hematologic: No abnormal or excessive bruising on exposed skin.   Skin: Warm, dry. Right hand with obvious cat bite puncture on wrist with some adjacent scratches. No current purulent drainage. Some mild erythema but no obvious warmth or cellulitis. Does have some firmness directly adjacent to the puncture site concerning for abscess formation with swelling extending up into her dorsal and palmar hand.  Musculoskeletal: Moves all extremities equally. Swelling of right wrist joint.   Neurologic: No focal deficits.     Medical Decision Making       75 MINUTES SPENT BY ME on the date of service doing chart review, history, exam, documentation & further activities per the note.      Data     I have personally reviewed the following data over the past 24 hrs:    9.6  \   13.7   / 229     136 104 11.2 /  95   3.8 22 0.57 \     Procal: N/A CRP: 36.90 (H) Lactic Acid: 0.6 (L)         Imaging results reviewed over the past 24 hrs:   No results found for this or any previous visit (from the past 24 hour(s)).

## 2024-05-07 ENCOUNTER — TELEPHONE (OUTPATIENT)
Dept: ORTHOPEDICS | Facility: CLINIC | Age: 39
End: 2024-05-07
Payer: COMMERCIAL

## 2024-05-07 ENCOUNTER — ANESTHESIA EVENT (OUTPATIENT)
Dept: SURGERY | Facility: CLINIC | Age: 39
End: 2024-05-07
Payer: COMMERCIAL

## 2024-05-07 ENCOUNTER — ANESTHESIA (OUTPATIENT)
Dept: SURGERY | Facility: CLINIC | Age: 39
End: 2024-05-07
Payer: COMMERCIAL

## 2024-05-07 VITALS
RESPIRATION RATE: 18 BRPM | DIASTOLIC BLOOD PRESSURE: 55 MMHG | TEMPERATURE: 98 F | HEIGHT: 69 IN | SYSTOLIC BLOOD PRESSURE: 96 MMHG | HEART RATE: 68 BPM | WEIGHT: 140 LBS | BODY MASS INDEX: 20.73 KG/M2 | OXYGEN SATURATION: 98 %

## 2024-05-07 PROBLEM — M65.949 TENOSYNOVITIS OF HAND: Status: ACTIVE | Noted: 2024-05-07

## 2024-05-07 LAB
ALBUMIN SERPL BCG-MCNC: 3.8 G/DL (ref 3.5–5.2)
ALP SERPL-CCNC: 53 U/L (ref 40–150)
ALT SERPL W P-5'-P-CCNC: 9 U/L (ref 0–50)
ANION GAP SERPL CALCULATED.3IONS-SCNC: 9 MMOL/L (ref 7–15)
AST SERPL W P-5'-P-CCNC: 9 U/L (ref 0–45)
BILIRUB SERPL-MCNC: 0.2 MG/DL
BUN SERPL-MCNC: 10.6 MG/DL (ref 6–20)
CALCIUM SERPL-MCNC: 8.2 MG/DL (ref 8.6–10)
CHLORIDE SERPL-SCNC: 104 MMOL/L (ref 98–107)
CREAT SERPL-MCNC: 0.74 MG/DL (ref 0.51–0.95)
CRP SERPL-MCNC: 26.51 MG/L
DEPRECATED HCO3 PLAS-SCNC: 25 MMOL/L (ref 22–29)
EGFRCR SERPLBLD CKD-EPI 2021: >90 ML/MIN/1.73M2
ERYTHROCYTE [DISTWIDTH] IN BLOOD BY AUTOMATED COUNT: 13 % (ref 10–15)
GLUCOSE SERPL-MCNC: 77 MG/DL (ref 70–99)
GRAM STAIN RESULT: NORMAL
HCT VFR BLD AUTO: 37.9 % (ref 35–47)
HGB BLD-MCNC: 12.6 G/DL (ref 11.7–15.7)
MCH RBC QN AUTO: 28.5 PG (ref 26.5–33)
MCHC RBC AUTO-ENTMCNC: 33.2 G/DL (ref 31.5–36.5)
MCV RBC AUTO: 86 FL (ref 78–100)
PLATELET # BLD AUTO: 200 10E3/UL (ref 150–450)
POTASSIUM SERPL-SCNC: 3.9 MMOL/L (ref 3.4–5.3)
PROT SERPL-MCNC: 5.8 G/DL (ref 6.4–8.3)
RBC # BLD AUTO: 4.42 10E6/UL (ref 3.8–5.2)
SODIUM SERPL-SCNC: 138 MMOL/L (ref 135–145)
WBC # BLD AUTO: 7.8 10E3/UL (ref 4–11)

## 2024-05-07 PROCEDURE — 360N000075 HC SURGERY LEVEL 2, PER MIN: Performed by: ORTHOPAEDIC SURGERY

## 2024-05-07 PROCEDURE — 272N000001 HC OR GENERAL SUPPLY STERILE: Performed by: ORTHOPAEDIC SURGERY

## 2024-05-07 PROCEDURE — 999N000141 HC STATISTIC PRE-PROCEDURE NURSING ASSESSMENT: Performed by: ORTHOPAEDIC SURGERY

## 2024-05-07 PROCEDURE — 87205 SMEAR GRAM STAIN: CPT | Performed by: ORTHOPAEDIC SURGERY

## 2024-05-07 PROCEDURE — 96376 TX/PRO/DX INJ SAME DRUG ADON: CPT

## 2024-05-07 PROCEDURE — 86140 C-REACTIVE PROTEIN: CPT | Performed by: PHYSICIAN ASSISTANT

## 2024-05-07 PROCEDURE — 250N000009 HC RX 250: Performed by: ORTHOPAEDIC SURGERY

## 2024-05-07 PROCEDURE — 250N000011 HC RX IP 250 OP 636: Performed by: EMERGENCY MEDICINE

## 2024-05-07 PROCEDURE — 258N000003 HC RX IP 258 OP 636: Performed by: EMERGENCY MEDICINE

## 2024-05-07 PROCEDURE — 36415 COLL VENOUS BLD VENIPUNCTURE: CPT | Performed by: PHYSICIAN ASSISTANT

## 2024-05-07 PROCEDURE — 370N000017 HC ANESTHESIA TECHNICAL FEE, PER MIN: Performed by: ORTHOPAEDIC SURGERY

## 2024-05-07 PROCEDURE — 250N000013 HC RX MED GY IP 250 OP 250 PS 637: Performed by: PHYSICIAN ASSISTANT

## 2024-05-07 PROCEDURE — 11042 DBRDMT SUBQ TIS 1ST 20SQCM/<: CPT | Performed by: NURSE ANESTHETIST, CERTIFIED REGISTERED

## 2024-05-07 PROCEDURE — 87102 FUNGUS ISOLATION CULTURE: CPT | Performed by: ORTHOPAEDIC SURGERY

## 2024-05-07 PROCEDURE — 250N000013 HC RX MED GY IP 250 OP 250 PS 637: Performed by: STUDENT IN AN ORGANIZED HEALTH CARE EDUCATION/TRAINING PROGRAM

## 2024-05-07 PROCEDURE — 258N000003 HC RX IP 258 OP 636: Performed by: NURSE ANESTHETIST, CERTIFIED REGISTERED

## 2024-05-07 PROCEDURE — 250N000011 HC RX IP 250 OP 636: Performed by: ORTHOPAEDIC SURGERY

## 2024-05-07 PROCEDURE — 99239 HOSP IP/OBS DSCHRG MGMT >30: CPT | Mod: GC

## 2024-05-07 PROCEDURE — 87075 CULTR BACTERIA EXCEPT BLOOD: CPT | Performed by: ORTHOPAEDIC SURGERY

## 2024-05-07 PROCEDURE — 87070 CULTURE OTHR SPECIMN AEROBIC: CPT | Performed by: ORTHOPAEDIC SURGERY

## 2024-05-07 PROCEDURE — 96375 TX/PRO/DX INJ NEW DRUG ADDON: CPT

## 2024-05-07 PROCEDURE — 80053 COMPREHEN METABOLIC PANEL: CPT | Performed by: PHYSICIAN ASSISTANT

## 2024-05-07 PROCEDURE — 11042 DBRDMT SUBQ TIS 1ST 20SQCM/<: CPT | Performed by: ANESTHESIOLOGY

## 2024-05-07 PROCEDURE — 710N000010 HC RECOVERY PHASE 1, LEVEL 2, PER MIN: Performed by: ORTHOPAEDIC SURGERY

## 2024-05-07 PROCEDURE — 85027 COMPLETE CBC AUTOMATED: CPT | Performed by: PHYSICIAN ASSISTANT

## 2024-05-07 PROCEDURE — G0378 HOSPITAL OBSERVATION PER HR: HCPCS

## 2024-05-07 PROCEDURE — 250N000011 HC RX IP 250 OP 636: Performed by: PHYSICIAN ASSISTANT

## 2024-05-07 PROCEDURE — 250N000009 HC RX 250: Performed by: NURSE ANESTHETIST, CERTIFIED REGISTERED

## 2024-05-07 PROCEDURE — 250N000011 HC RX IP 250 OP 636: Performed by: NURSE ANESTHETIST, CERTIFIED REGISTERED

## 2024-05-07 RX ORDER — HYDROMORPHONE HYDROCHLORIDE 1 MG/ML
0.4 INJECTION, SOLUTION INTRAMUSCULAR; INTRAVENOUS; SUBCUTANEOUS EVERY 5 MIN PRN
Status: DISCONTINUED | OUTPATIENT
Start: 2024-05-07 | End: 2024-05-07

## 2024-05-07 RX ORDER — DEXAMETHASONE SODIUM PHOSPHATE 4 MG/ML
4 INJECTION, SOLUTION INTRA-ARTICULAR; INTRALESIONAL; INTRAMUSCULAR; INTRAVENOUS; SOFT TISSUE
Status: DISCONTINUED | OUTPATIENT
Start: 2024-05-07 | End: 2024-05-07 | Stop reason: HOSPADM

## 2024-05-07 RX ORDER — DOXYCYCLINE 100 MG/1
100 CAPSULE ORAL EVERY 12 HOURS SCHEDULED
Status: DISCONTINUED | OUTPATIENT
Start: 2024-05-07 | End: 2024-05-07

## 2024-05-07 RX ORDER — MAGNESIUM HYDROXIDE 1200 MG/15ML
LIQUID ORAL PRN
Status: DISCONTINUED | OUTPATIENT
Start: 2024-05-07 | End: 2024-05-07 | Stop reason: HOSPADM

## 2024-05-07 RX ORDER — OXYCODONE HYDROCHLORIDE 10 MG/1
10 TABLET ORAL
Status: DISCONTINUED | OUTPATIENT
Start: 2024-05-07 | End: 2024-05-07 | Stop reason: HOSPADM

## 2024-05-07 RX ORDER — ONDANSETRON 2 MG/ML
4 INJECTION INTRAMUSCULAR; INTRAVENOUS EVERY 30 MIN PRN
Status: DISCONTINUED | OUTPATIENT
Start: 2024-05-07 | End: 2024-05-07

## 2024-05-07 RX ORDER — DEXAMETHASONE SODIUM PHOSPHATE 4 MG/ML
4 INJECTION, SOLUTION INTRA-ARTICULAR; INTRALESIONAL; INTRAMUSCULAR; INTRAVENOUS; SOFT TISSUE
Status: DISCONTINUED | OUTPATIENT
Start: 2024-05-07 | End: 2024-05-07

## 2024-05-07 RX ORDER — ONDANSETRON 4 MG/1
4 TABLET, ORALLY DISINTEGRATING ORAL EVERY 30 MIN PRN
Status: DISCONTINUED | OUTPATIENT
Start: 2024-05-07 | End: 2024-05-07 | Stop reason: HOSPADM

## 2024-05-07 RX ORDER — ONDANSETRON 2 MG/ML
4 INJECTION INTRAMUSCULAR; INTRAVENOUS EVERY 30 MIN PRN
Status: DISCONTINUED | OUTPATIENT
Start: 2024-05-07 | End: 2024-05-07 | Stop reason: HOSPADM

## 2024-05-07 RX ORDER — LIDOCAINE HYDROCHLORIDE 20 MG/ML
INJECTION, SOLUTION INFILTRATION; PERINEURAL PRN
Status: DISCONTINUED | OUTPATIENT
Start: 2024-05-07 | End: 2024-05-07

## 2024-05-07 RX ORDER — DOXYCYCLINE 100 MG/1
100 CAPSULE ORAL EVERY 12 HOURS SCHEDULED
Status: DISCONTINUED | OUTPATIENT
Start: 2024-05-07 | End: 2024-05-07 | Stop reason: HOSPADM

## 2024-05-07 RX ORDER — FENTANYL CITRATE 50 UG/ML
INJECTION, SOLUTION INTRAMUSCULAR; INTRAVENOUS PRN
Status: DISCONTINUED | OUTPATIENT
Start: 2024-05-07 | End: 2024-05-07

## 2024-05-07 RX ORDER — PROPOFOL 10 MG/ML
INJECTION, EMULSION INTRAVENOUS CONTINUOUS PRN
Status: DISCONTINUED | OUTPATIENT
Start: 2024-05-07 | End: 2024-05-07

## 2024-05-07 RX ORDER — SODIUM CHLORIDE, SODIUM LACTATE, POTASSIUM CHLORIDE, CALCIUM CHLORIDE 600; 310; 30; 20 MG/100ML; MG/100ML; MG/100ML; MG/100ML
INJECTION, SOLUTION INTRAVENOUS CONTINUOUS PRN
Status: DISCONTINUED | OUTPATIENT
Start: 2024-05-07 | End: 2024-05-07

## 2024-05-07 RX ORDER — SERTRALINE HYDROCHLORIDE 100 MG/1
100 TABLET, FILM COATED ORAL DAILY
COMMUNITY

## 2024-05-07 RX ORDER — PROPOFOL 10 MG/ML
INJECTION, EMULSION INTRAVENOUS PRN
Status: DISCONTINUED | OUTPATIENT
Start: 2024-05-07 | End: 2024-05-07

## 2024-05-07 RX ORDER — DEXAMETHASONE SODIUM PHOSPHATE 4 MG/ML
INJECTION, SOLUTION INTRA-ARTICULAR; INTRALESIONAL; INTRAMUSCULAR; INTRAVENOUS; SOFT TISSUE PRN
Status: DISCONTINUED | OUTPATIENT
Start: 2024-05-07 | End: 2024-05-07

## 2024-05-07 RX ORDER — BUPIVACAINE HYDROCHLORIDE 2.5 MG/ML
INJECTION, SOLUTION INFILTRATION; PERINEURAL PRN
Status: DISCONTINUED | OUTPATIENT
Start: 2024-05-07 | End: 2024-05-07 | Stop reason: HOSPADM

## 2024-05-07 RX ORDER — ONDANSETRON 2 MG/ML
INJECTION INTRAMUSCULAR; INTRAVENOUS PRN
Status: DISCONTINUED | OUTPATIENT
Start: 2024-05-07 | End: 2024-05-07

## 2024-05-07 RX ORDER — NALOXONE HYDROCHLORIDE 0.4 MG/ML
0.1 INJECTION, SOLUTION INTRAMUSCULAR; INTRAVENOUS; SUBCUTANEOUS
Status: DISCONTINUED | OUTPATIENT
Start: 2024-05-07 | End: 2024-05-07 | Stop reason: HOSPADM

## 2024-05-07 RX ORDER — SODIUM CHLORIDE, SODIUM LACTATE, POTASSIUM CHLORIDE, CALCIUM CHLORIDE 600; 310; 30; 20 MG/100ML; MG/100ML; MG/100ML; MG/100ML
INJECTION, SOLUTION INTRAVENOUS CONTINUOUS
Status: DISCONTINUED | OUTPATIENT
Start: 2024-05-07 | End: 2024-05-07

## 2024-05-07 RX ORDER — OXYCODONE HYDROCHLORIDE 5 MG/1
5 TABLET ORAL
Status: DISCONTINUED | OUTPATIENT
Start: 2024-05-07 | End: 2024-05-07 | Stop reason: HOSPADM

## 2024-05-07 RX ORDER — KETOROLAC TROMETHAMINE 15 MG/ML
15 INJECTION, SOLUTION INTRAMUSCULAR; INTRAVENOUS EVERY 6 HOURS PRN
Status: DISCONTINUED | OUTPATIENT
Start: 2024-05-07 | End: 2024-05-07 | Stop reason: HOSPADM

## 2024-05-07 RX ORDER — ONDANSETRON 4 MG/1
4 TABLET, ORALLY DISINTEGRATING ORAL EVERY 30 MIN PRN
Status: DISCONTINUED | OUTPATIENT
Start: 2024-05-07 | End: 2024-05-07

## 2024-05-07 RX ORDER — NALOXONE HYDROCHLORIDE 0.4 MG/ML
0.1 INJECTION, SOLUTION INTRAMUSCULAR; INTRAVENOUS; SUBCUTANEOUS
Status: DISCONTINUED | OUTPATIENT
Start: 2024-05-07 | End: 2024-05-07

## 2024-05-07 RX ORDER — SERTRALINE HYDROCHLORIDE 100 MG/1
100 TABLET, FILM COATED ORAL DAILY
Status: DISCONTINUED | OUTPATIENT
Start: 2024-05-07 | End: 2024-05-07 | Stop reason: HOSPADM

## 2024-05-07 RX ORDER — HYDROMORPHONE HYDROCHLORIDE 1 MG/ML
0.2 INJECTION, SOLUTION INTRAMUSCULAR; INTRAVENOUS; SUBCUTANEOUS EVERY 5 MIN PRN
Status: DISCONTINUED | OUTPATIENT
Start: 2024-05-07 | End: 2024-05-07

## 2024-05-07 RX ORDER — DOXYCYCLINE 100 MG/1
100 CAPSULE ORAL EVERY 12 HOURS
Qty: 18 CAPSULE | Refills: 0 | Status: SHIPPED | OUTPATIENT
Start: 2024-05-07 | End: 2024-05-16

## 2024-05-07 RX ORDER — FENTANYL CITRATE 50 UG/ML
25 INJECTION, SOLUTION INTRAMUSCULAR; INTRAVENOUS EVERY 5 MIN PRN
Status: DISCONTINUED | OUTPATIENT
Start: 2024-05-07 | End: 2024-05-07

## 2024-05-07 RX ORDER — FENTANYL CITRATE 50 UG/ML
50 INJECTION, SOLUTION INTRAMUSCULAR; INTRAVENOUS EVERY 5 MIN PRN
Status: DISCONTINUED | OUTPATIENT
Start: 2024-05-07 | End: 2024-05-07

## 2024-05-07 RX ADMIN — DEXAMETHASONE SODIUM PHOSPHATE 4 MG: 4 INJECTION, SOLUTION INTRA-ARTICULAR; INTRALESIONAL; INTRAMUSCULAR; INTRAVENOUS; SOFT TISSUE at 08:39

## 2024-05-07 RX ADMIN — SODIUM CHLORIDE, POTASSIUM CHLORIDE, SODIUM LACTATE AND CALCIUM CHLORIDE: 600; 310; 30; 20 INJECTION, SOLUTION INTRAVENOUS at 08:31

## 2024-05-07 RX ADMIN — ACETAMINOPHEN 975 MG: 325 TABLET, FILM COATED ORAL at 10:19

## 2024-05-07 RX ADMIN — KETOROLAC TROMETHAMINE 30 MG: 30 INJECTION, SOLUTION INTRAMUSCULAR at 10:19

## 2024-05-07 RX ADMIN — SERTRALINE HYDROCHLORIDE 100 MG: 100 TABLET ORAL at 11:22

## 2024-05-07 RX ADMIN — PIPERACILLIN AND TAZOBACTAM 3.38 G: 3; .375 INJECTION, POWDER, LYOPHILIZED, FOR SOLUTION INTRAVENOUS at 12:28

## 2024-05-07 RX ADMIN — PIPERACILLIN AND TAZOBACTAM 3.38 G: 3; .375 INJECTION, POWDER, LYOPHILIZED, FOR SOLUTION INTRAVENOUS at 00:35

## 2024-05-07 RX ADMIN — DIPHENHYDRAMINE HYDROCHLORIDE 25 MG: 25 CAPSULE ORAL at 13:30

## 2024-05-07 RX ADMIN — VANCOMYCIN HYDROCHLORIDE 1250 MG: 10 INJECTION, POWDER, LYOPHILIZED, FOR SOLUTION INTRAVENOUS at 13:21

## 2024-05-07 RX ADMIN — FENTANYL CITRATE 50 MCG: 50 INJECTION INTRAMUSCULAR; INTRAVENOUS at 08:41

## 2024-05-07 RX ADMIN — ONDANSETRON 4 MG: 2 INJECTION INTRAMUSCULAR; INTRAVENOUS at 08:39

## 2024-05-07 RX ADMIN — FENTANYL CITRATE 50 MCG: 50 INJECTION INTRAMUSCULAR; INTRAVENOUS at 08:36

## 2024-05-07 RX ADMIN — ACETAMINOPHEN 975 MG: 325 TABLET, FILM COATED ORAL at 00:49

## 2024-05-07 RX ADMIN — KETOROLAC TROMETHAMINE 30 MG: 30 INJECTION, SOLUTION INTRAMUSCULAR at 00:49

## 2024-05-07 RX ADMIN — DIPHENHYDRAMINE HYDROCHLORIDE 25 MG: 25 CAPSULE ORAL at 02:39

## 2024-05-07 RX ADMIN — LIDOCAINE HYDROCHLORIDE 20 MG: 20 INJECTION, SOLUTION INFILTRATION; PERINEURAL at 08:39

## 2024-05-07 RX ADMIN — PROPOFOL 100 MCG/KG/MIN: 10 INJECTION, EMULSION INTRAVENOUS at 08:39

## 2024-05-07 RX ADMIN — MIDAZOLAM 2 MG: 1 INJECTION INTRAMUSCULAR; INTRAVENOUS at 08:31

## 2024-05-07 RX ADMIN — PIPERACILLIN AND TAZOBACTAM 3.38 G: 3; .375 INJECTION, POWDER, LYOPHILIZED, FOR SOLUTION INTRAVENOUS at 05:35

## 2024-05-07 RX ADMIN — VANCOMYCIN HYDROCHLORIDE 1250 MG: 10 INJECTION, POWDER, LYOPHILIZED, FOR SOLUTION INTRAVENOUS at 02:43

## 2024-05-07 RX ADMIN — PROPOFOL 50 MG: 10 INJECTION, EMULSION INTRAVENOUS at 08:39

## 2024-05-07 RX ADMIN — PROPOFOL 50 MG: 10 INJECTION, EMULSION INTRAVENOUS at 08:44

## 2024-05-07 RX ADMIN — DEXMEDETOMIDINE HYDROCHLORIDE 10 MCG: 100 INJECTION, SOLUTION INTRAVENOUS at 09:14

## 2024-05-07 RX ADMIN — OXYCODONE HYDROCHLORIDE 5 MG: 5 TABLET ORAL at 06:03

## 2024-05-07 ASSESSMENT — ACTIVITIES OF DAILY LIVING (ADL)
ADLS_ACUITY_SCORE: 21
ADLS_ACUITY_SCORE: 23
ADLS_ACUITY_SCORE: 21
ADLS_ACUITY_SCORE: 23
ADLS_ACUITY_SCORE: 23
ADLS_ACUITY_SCORE: 21
ADLS_ACUITY_SCORE: 21
ADLS_ACUITY_SCORE: 23
ADLS_ACUITY_SCORE: 21
ADLS_ACUITY_SCORE: 23
ADLS_ACUITY_SCORE: 23

## 2024-05-07 NOTE — UTILIZATION REVIEW
"Inpatient to Observation note:    Admission Status; Secondary Review Determination         Under the authority of the Utilization Management Committee, the utilization review process indicated a secondary review on the above patient.  The review outcome is based on review of the medical records, discussions with staff, and applying clinical experience noted on the date of the review.          (x) Observation Status Appropriate - This patient does not meet hospital inpatient criteria and is placed in observation status. If this patient's primary payer is Medicare and was admitted as an inpatient, Condition Code 44 should be used and patient status changed to \"observation\".     RATIONALE FOR DETERMINATION   38-year-old female was admitted to [facility] on 5/6/2024 due to soft tissue infection of the right upper extremity secondary to cat bite.  She underwent right upper extremity I&D today and has been cleared for discharge home on oral antibiotics.  She does not meet inpatient criteria at this time.    The severity of illness, intensity of service provided, expected LOS and risk for adverse outcome make the care appropriate for further observation; however, doesn't meet criteria for hospital inpatient admission. Dr Minor notified of this determination.    This document was produced using voice recognition software.      The information on this document is developed by the utilization review team in order for the business office to ensure compliance.  This only denotes the appropriateness of proper admission status and does not reflect the quality of care rendered.         The definitions of Inpatient Status and Observation Status used in making the determination above are those provided in the CMS Coverage Manual, Chapter 1 and Chapter 6, section 70.4.      Sincerely,  Yeyo Gamez MD    Utilization Review  Physician Advisor  [facility].   "

## 2024-05-07 NOTE — PLAN OF CARE
"Goal Outcome Evaluation:      Plan of Care Reviewed With: patient    Overall Patient Progress: no change    Blood pressure 113/81, pulse 62, temperature 98.4  F (36.9  C), temperature source Oral, resp. rate 18, height 1.753 m (5' 9\"), weight 63.5 kg (140 lb), SpO2 100%.    Outcome Evaluation: Patient alert and oriented x4, able to make needs known. Stable on room air, denies chest pain and SOB. Pain managed with PRN Tylenol, Oxycodone and Toradol. Right arm elevated on pillows, CHG bath completed. Patient in bed resting, no distress noted during assessment, call light within reach. Patient expresses no needs at this time. Plan of care ongoing.    Patient scheduled OR at 0830., report given to PACU nurse      "

## 2024-05-07 NOTE — TELEPHONE ENCOUNTER
----- Message from Genesis Israel MD sent at 5/7/2024  9:27 AM CDT -----  Regarding: post op follow up  Hello,     Please schedule Benita post op clinic follow up in two weeks in the foot and ankle nurse clinic for a wound check and suture removal.     Thank you,   Genesis

## 2024-05-07 NOTE — DISCHARGE INSTRUCTIONS
Non weight bearing right upper extremity to allow for wound to heal. Okay to use hand for ADL's. Otherwise no heavy lifting until follow up.   Keep splint and dressings intact x 3 days. Then okay to remove splint and dressing. Place new dressing over incision and use same plaster splint wrapped with ACE wrap.  Follow up in two weeks for wound check with foot and ankle nurse - this will be made for you and our clinic will call you to schedule.

## 2024-05-07 NOTE — ANESTHESIA PREPROCEDURE EVALUATION
Anesthesia Pre-Procedure Evaluation    Patient: Benita Harris   MRN: [id number] : 1985        Procedure : Procedure(s):  Incision and drainage upper extremity, combined          History reviewed. No pertinent past medical history.   History reviewed. No pertinent surgical history.   No Known Allergies   Social History     Tobacco Use     Smoking status: Never     Smokeless tobacco: Never   Substance Use Topics     Alcohol use: Not on file      Wt Readings from Last 1 Encounters:   24 63.5 kg (140 lb)        Anesthesia Evaluation   Pt has had prior anesthetic. Type: General and Regional.        ROS/MED HX  ENT/Pulmonary:  - neg pulmonary ROS     Neurologic:  - neg neurologic ROS     Cardiovascular:  - neg cardiovascular ROS     METS/Exercise Tolerance:     Hematologic:  - neg hematologic  ROS     Musculoskeletal: Comment: Cat bit right upper extremity      GI/Hepatic:  - neg GI/hepatic ROS     Renal/Genitourinary:  - neg Renal ROS     Endo:  - neg endo ROS     Psychiatric/Substance Use:     (+) psychiatric history depression       Infectious Disease: Comment: Cat bite right upper extremity      Malignancy:  - neg malignancy ROS     Other:          Physical Exam    Airway        Mallampati: I   TM distance: > 3 FB   Neck ROM: full   Mouth opening: > 3 cm    Respiratory Devices and Support         Dental       (+) Minor Abnormalities - some fillings, tiny chips      Cardiovascular   cardiovascular exam normal       Rhythm and rate: regular and normal     Pulmonary   pulmonary exam normal        breath sounds clear to auscultation       OUTSIDE LABS:  CBC:   Lab Results   Component Value Date    WBC 7.8 2024    WBC 9.6 2024    HGB 12.6 2024    HGB 13.7 2024    HCT 37.9 2024    HCT 41.3 2024     2024     2024     BMP:   Lab Results   Component Value Date     2024     2024    POTASSIUM 3.9 2024    POTASSIUM 3.8  05/06/2024    CHLORIDE 104 05/07/2024    CHLORIDE 104 05/06/2024    CO2 25 05/07/2024    CO2 22 05/06/2024    BUN 10.6 05/07/2024    BUN 11.2 05/06/2024    CR 0.74 05/07/2024    CR 0.57 05/06/2024    GLC 77 05/07/2024    GLC 95 05/06/2024     COAGS:   Lab Results   Component Value Date    INR 1.02 05/06/2024     POC:   Lab Results   Component Value Date    HCG Negative 05/06/2024     HEPATIC:   Lab Results   Component Value Date    ALBUMIN 3.8 05/07/2024    PROTTOTAL 5.8 (L) 05/07/2024    ALT 9 05/07/2024    AST 9 05/07/2024    ALKPHOS 53 05/07/2024    BILITOTAL 0.2 05/07/2024     OTHER:   Lab Results   Component Value Date    LACT 0.6 (L) 05/06/2024    DANA 8.2 (L) 05/07/2024    SED 9 05/06/2024       Anesthesia Plan    ASA Status:  1    NPO Status:  NPO Appropriate    Anesthesia Type: MAC.     - Reason for MAC: immobility needed, straight local not clinically adequate   Induction: Intravenous, Propofol.   Maintenance: TIVA.        Consents    Anesthesia Plan(s) and associated risks, benefits, and realistic alternatives discussed. Questions answered and patient/representative(s) expressed understanding.     - Discussed: Risks, Benefits and Alternatives for BOTH SEDATION and the PROCEDURE were discussed     - Discussed with:  Patient      - Extended Intubation/Ventilatory Support Discussed: No.      - Patient is DNR/DNI Status: No          Postoperative Care    Pain management: Multi-modal analgesia.   PONV prophylaxis: Ondansetron (or other 5HT-3), Dexamethasone or Solumedrol     Comments:             Óscar Sinha MD    I have reviewed the pertinent notes and labs in the chart from the past 30 days and (re)examined the patient.  Any updates or changes from those notes are reflected in this note.       # Hypocalcemia: Lowest Ca = 8.2 mg/dL in last 2 days, will monitor and replace as appropriate

## 2024-05-07 NOTE — PROGRESS NOTES
"Orthopaedic Surgery Progress Note   05/07/2024    Subjective: No acute events overnight. Pain well controlled. Pain and erythema slightly improved overnight. Pain localized to 3 bite sites. Denies fevers, chills, nausea.     Objective: /74 (BP Location: Left arm)   Pulse 67   Temp 98.3  F (36.8  C) (Oral)   Resp 16   Ht 1.753 m (5' 9\")   Wt 63.5 kg (140 lb)   SpO2 99%   BMI 20.67 kg/m      General: NAD, alert and oriented, cooperative with exam.   Cardio: extremities wwp.   Respiratory: Non-labored breathing.  MSK: Focused examination of     RUE reveals fingers wwp, radial pulse 2+, bcr in all digits. +EPL/FPL/IO. Able to flex and extend all digits. No short arm wrist pain with ROM. SILT M/R/U. TTP localized over three bite wounds over dorsal/radial wrist. No palmar tenderness. No TTP over wrist joint.     Assessment and Plan: Benita Harris is a 38 year old female with cellulitis and extensor tenosynovitis following a cat bite.     Plan for OR today for I&D RUE.   NPO for OR  Continue Abx per primary.   Medically optimized for surgery per medicine.    Genesis Israel MD   Orthopedic Surgery, PGY-4      "

## 2024-05-07 NOTE — PLAN OF CARE
"Goal Outcome Evaluation:  /74 (BP Location: Left arm)   Pulse 67   Temp 98.3  F (36.8  C) (Oral)   Resp 16   Ht 1.753 m (5' 9\")   Wt 63.5 kg (140 lb)   SpO2 99%   BMI 20.67 kg/m      Plan of Care Reviewed With: patient  Overall Patient Progress: no changeOverall Patient Progress: no change  Outcome Evaluation: pt is alert and oriented x4, able to make needs known, on room air, PIV left arm SL, med for pain 8/10 with prn Oxycodone  Pt is NPO except for meds, CHG bath done x1 , needs another CHG bath in the AM, red raised bump on right shoulder from vaccination earlier, wm pack applied. Amb to bathroom ad sandrine, had BM this evening. For surgery tomorrow, dispo TBD, continue plan of care   "

## 2024-05-07 NOTE — OP NOTE
Date of surgery: 5/7/2024      Preoperative diagnosis: Right upper extremity infection     Postoperative diagnosis: Right upper extremity infection     Procedure: Right upper extremity irrigation and debridement of the distal third of the forearm     Surgeons: Drew Torres MD     Assistants: Genesis Dodge Md PGY-4 Orthopaedic Resident     Complications: None     Drains: None     EBL: Less than 20 cc     Anesthesia: IV sedation    Pathology: Routine cultures labeled #1 and #2 right upper arm for aerobe, anaerobe, Gram stain and fungal     Indications: Please refer to hospital chart for further details     Procedure note: On 5/7/2024 patient was taken to surgery.  Preoperative antibiotics were administered to the patient prior to arrival to the OR     After successful induction of IV sedation she  was placed supine on the table.  The right upper extremity was prepped and draped in sterile fashion.  After exsanguination by gravity, tourniquet cuff was inflated to 250 mmHg on the proximal third of the right upper arm.      The pause for the cause was performed according to our institutional policy which confirmed laterality of the procedure.     An incision was made along the dorsal aspect of the distal third of the forearm.  Subcutaneous's were dissected.  We proceeded indication of a fluid collection that presented some murky appearance although it was no true purulent material.  These were surrounded by soft tissues.  Day fluid was collected for routine cultures which were labeled #1 and #2 respectively right arm.  Cultures were sent for aerobic anaerobic Gram stain and fungal.    A total of 2 L of normal saline were irrigated through the wound and eventually the skin edges were approximated with suture monofilament without any deep sutures.     Tourniquet was deflated.  Satisfactory hemostasis was accomplished. Sterile dressings were applied.  Patient was placed in short arm splint and transferred in stable  condition to PACU.      Plan: Patient will remain nonweightbearing on the right upper extremity x 2 weeks.  She will be managed from an antibiotic perspective by ID service.  Patient will return to clinic at 2 weeks from surgery and at that time sutures were removed indicated.    Patient is not expected to require any occupational therapy however that is her desire that will be performed without any restrictions.    Patient will be reevaluated at 6 weeks from surgery and at that time no x-rays will be obtained.    Drew Torres MD

## 2024-05-07 NOTE — ANESTHESIA CARE TRANSFER NOTE
Patient: Benita Harris    Procedure: Procedure(s):  Incision and drainage upper extremity, combined       Diagnosis: Infection [B99.9]  Diagnosis Additional Information: No value filed.    Anesthesia Type:   MAC     Note:    Oropharynx: oropharynx clear of all foreign objects and spontaneously breathing  Level of Consciousness: awake  Oxygen Supplementation: room air    Independent Airway: airway patency satisfactory and stable  Dentition: dentition unchanged  Vital Signs Stable: post-procedure vital signs reviewed and stable  Report to RN Given: handoff report given  Patient transferred to: PACU    Handoff Report: Identifed the Patient, Identified the Reponsible Provider, Reviewed the pertinent medical history, Discussed the surgical course, Reviewed Intra-OP anesthesia mangement and issues during anesthesia, Set expectations for post-procedure period and Allowed opportunity for questions and acknowledgement of understanding      Vitals:  Vitals Value Taken Time   /84 05/07/24 0916   Temp 36.3  C (97.3  F) 05/07/24 0916   Pulse 73 05/07/24 0915   Resp 16 05/07/24 0916   SpO2 98 % 05/07/24 0920   Vitals shown include unfiled device data.    Electronically Signed By: MARISOL King CRNA  May 7, 2024  9:22 AM

## 2024-05-07 NOTE — PLAN OF CARE
Patient arrived the unit at 0945  am from PACU .  Post op vitals signs started.Patient is stable on RA .  Opens eyes spontaneously  Pain rate 5,Toradol and Tylenol given  Continuous pulse Oximetry  PCDs on

## 2024-05-07 NOTE — H&P
[facility]    History and Physical - [facility]       Date of Admission:  5/6/2024    Assessment & Plan      Benita Harris is a 38 year old female admitted on 5/6/2024. She has no significant past medical history. She was bit by her cat on 5/4/24 while rescuing it from a dog attack. Seen in ED at that time with XR notable for some swelling at first metacarpal and wrist, no radiopaque foreign body. Discharged on augmentin. Has had worsening swelling, pain and now some associated numbness and firmness at puncture site concerning for developing abscess with risk for joint infection. Admitted for IV abx and Orthopedics evaluation.     Soft tissue infection secondary to cat bite  Initially on augmentin with apparent treatment failure and progression of swelling, pain. Developed some purulent drainage and now has some numbness to her thenar eminence of right hand with swelling progressing into her hand. Tetanus was updated in ED on 5/4. Mildly elevated CRP, WBC and lactic wnl. No current systemic symptoms. Started on vancomycin and zosyn in the ED. Did develop itching with vanco - infusion slowed and received IV benadryl per ED provider. Ortho consulted and planning for OR I&D of bite wounds.   - Orthopedics consulted, appreciate recommendations   - Continue vancomcyin, zosyn 3.375g q6h  - Oral benadryl for pre-med/itching with further vanco administrations   - Pending blood culture x2  - Trend CBC, CRP  - Pain: tylenol, toradol, oxycodone PRN  - NPO at midnight     - Patient is medically clear for procedure from medicine stand point.         Diet: NPO per Anesthesia Guidelines for Procedure/Surgery Except for: Meds  NPO per Anesthesia Guidelines for Procedure/Surgery Except for: Meds    DVT Prophylaxis: Low Risk/Ambulatory with no VTE prophylaxis indicated  Velez Catheter: Not present  Lines: PIV     Cardiac Monitoring: None  Code Status: Full Code       Clinically Significant Risk Factors Present on Admission                                  Disposition Plan     Medically Ready for Discharge: Anticipated in 2-4 Days         The patient's care was discussed with the Attending Physician, Dr. Millan .    Akhil Fontanez MD  [facility]  [facility]  Securely message with [employer] (more info)  Text page via [facility] Paging/Directory   See signed in provider for up to date coverage information    ______________________________________________________________________    Chief Complaint   Cat bite     History is obtained from the patient    History of Present Illness   Benita Harris is a 38 year old female who presented to the ED with a cat bit she got on Saturday. Had been outside with her cat in a pet area. Neighbor came in with 2 unleashed dogs and allowed them into the area and they went to attack her cat. She pulled the cat away from the dogs and cat bit her. She was seen in our ED on Saturday and discharged on augmentin. Developed worsening swelling yesterday into today. Has developed some numbness around her thumb. Swelling leading to decreased flexion/extension of her wrist. Has some firmness near the puncture site. Did have some purulence yesterday. Denies any fevers, chills. Otherwise in usual state of health. No significant past medical history. No known drug allergies. Did get itchy with vancomycin administration - rate of fusion slowed and given IV benadryl. Did note dislike for feeling of IV benadryl. Pain well controlled with orals.       Past Medical History    No past medical history on file.    Past Surgical History   No past surgical history on file.    Prior to Admission Medications   Prior to Admission Medications   Prescriptions Last Dose Informant Patient Reported? Taking?   amoxicillin-clavulanate (AUGMENTIN) 875-125 MG tablet   No No   Sig: Take 1 tablet by mouth 2 times daily for 7  days   ibuprofen (ADVIL/MOTRIN) 600 MG tablet   No No   Sig: Take 1 tablet (600 mg) by mouth every 6 hours as needed      Facility-Administered Medications: None        Review of Systems    The 10 point Review of Systems is negative other than noted in the HPI or here.     Social History   I have reviewed this patient's social history and updated it with pertinent information if needed.         Allergies   No Known Allergies     Physical Exam   Vital Signs: Temp: 98.3  F (36.8  C) Temp src: Oral BP: 107/74 Pulse: 67   Resp: 16 SpO2: 99 % O2 Device: None (Room air)    Weight: 140 lbs 0 oz    General Appearance: Awake. Alert and oriented x4. Sitting up in bed. No acute distress.   Eyes: Normal lids. Anicteric sclera. Pupils equal and round.   HEENT: Normocephalic, atraumatic. Nares patent. Mucous membranes moist.   Respiratory: Normal work of breathing on room air. Lungs CTAB. No wheezes.   Cardiovascular: RRR. S1, S2. No murmurs.   Skin: Warm, dry. Right hand with obvious cat bite puncture on wrist with some adjacent scratches. No current purulent drainage. Some mild erythema but no obvious warmth or cellulitis. Does have some firmness directly adjacent to the puncture site concerning for abscess formation with swelling extending up into her dorsal and palmar hand.  Musculoskeletal: Moves all extremities equally. Swelling of right wrist joint.   Neurologic: No focal deficits.       Medical Decision Making             Data     I have personally reviewed the following data over the past 24 hrs:    9.6  \   13.7   / 229     136 104 11.2 /  95   3.8 22 0.57 \     Procal: N/A CRP: 36.90 (H) Lactic Acid: 0.6 (L)       INR:  1.02 PTT:  N/A   D-dimer:  N/A Fibrinogen:  N/A       Imaging results reviewed over the past 24 hrs:   Recent Results (from the past 24 hour(s))   CT Hand Right w Contrast    Narrative    Exam: CT HAND RIGHT W CONTRAST 5/6/2024 4:33 PM    History: infected cat bite.    Techniques: Multislice CT  examination was performed of the right hand  with field of view extended to the mid forearm multiplanar  reconstructions displayed in multiple window settings. Imaging was  performed with IV contrast material.     Contrast: 86mL isovue 370    DLP: 435 mGy-cm    Comparison: 5/14/2024    Findings:    Bones:    No fracture. No CT evidence of suspicious osteolysis or erosion to  suggest osteomyelitis.    Tiny mineralization dorsal to the scaphoid, nonspecific may be  hydroxyapatite deposition or sequelae of remote trauma.    Soft tissues:   Evaluation of the soft tissue, particularly internal derangement of  joint assessment is limited with CT technique.     Diffuse dorsal soft tissue swelling and subcutaneous stranding of the  hand distally from the level of the metacarpals extending proximally  to the proximal field of view, mid forearm. No evidence of  subcutaneous emphysema.    Small to moderate tenosynovial fluid along the second and third  extensor compartments in the region of distal intersection, underlying  septic tenosynovitis in the current provided clinical setting cannot  be excluded. Otherwise, no drainable fluid collection.    Suspect small radiocarpal joint effusion.      Impression    Impression:  1. Apparent second and third extensor compartment tenosynovitis  centered at the level of distal intersection. Septic tenosynovitis is  in differential in the current clinical setting.  2. Diffuse dorsal soft tissue swelling and subcutaneous stranding of  the hand distally from the level of the metacarpals extending  proximally to the proximal field of view, mid forearm. No evidence of  subcutaneous emphysema. Finding presumably related to cellulitis in  the current setting.    ABNER SAMIR         SYSTEM ID:  [id number]

## 2024-05-07 NOTE — ANESTHESIA POSTPROCEDURE EVALUATION
Patient: Benita Harris    Procedure: Procedure(s):  Incision and drainage upper extremity, combined       Anesthesia Type:  MAC    Note:  Disposition: Inpatient   Postop Pain Control: Uneventful            Sign Out: Well controlled pain   PONV: No   Neuro/Psych: Uneventful            Sign Out: Acceptable/Baseline neuro status   Airway/Respiratory: Uneventful            Sign Out: Acceptable/Baseline resp. status   CV/Hemodynamics: Uneventful            Sign Out: Acceptable CV status; No obvious hypovolemia; No obvious fluid overload   Other NRE: NONE   DID A NON-ROUTINE EVENT OCCUR? No       Last vitals:  Vitals Value Taken Time   /84 05/07/24 0916   Temp 36.3  C (97.3  F) 05/07/24 0916   Pulse 73 05/07/24 0915   Resp 16 05/07/24 0916   SpO2 94 % 05/07/24 0926   Vitals shown include unfiled device data.    Electronically Signed By: Óscar Sinha MD  May 7, 2024  9:28 AM

## 2024-05-07 NOTE — DISCHARGE SUMMARY
[facility]  Discharge Summary - Medicine & Pediatrics       Date of Admission:  5/6/2024  Date of Discharge:  5/7/2024  Discharging Provider: Dr. Sukumar Celaya   Discharge Service: [facility]    Discharge Diagnoses     # Right hand cellulitis and tenosynovitis s/p I&D (5/7/24)   # Cat bite infection    Clinically Significant Risk Factors          Follow-ups Needed After Discharge     1.) PCP: Recommended scheduling appointment within 7 days of discharge.   - Review medication changes (added Augmentin and Doxycycline).  - Recommend BMP given received Toradol in hospital.   - Ensure Orthopedic follow up scheduled.     2.) Orthopedics: Follow up in two weeks for wound check.     Discharge Disposition   Discharged to home  Condition at discharge: Stable    Hospital Course   Benita Harris was admitted on 5/6/2024 for right hand tenosynovitis related to recent cat bite. The following problems were addressed during her hospitalization:    # Right hand cellulitis and tenosynovitis s/p I&D (5/7/24)   # Cat bite infection   Sustained bite from her cat 5/4/24. Evaluated in ED that day with XR showing no foreign body. Initiated on Augmentin. TDAP updated. Continued to worsen as outpatient- prompting admission 5/6. CT with evidence of tenosynovitis. Underwent I&D 5/7 without complications. Cleared for discharge by Orthopedics. Will discharge on antibiotics covering for MRSA given improvement overnight on Vancomycin even prior to procedure.   - Continue Augmentin 875-125 mg BID and doxycyline 100 mg BID until 5/16/24 (10-day total course)   - Continue Tylenol and ibuprofen PRN for pain   - Follow up with PCP for recheck; recommend BMP  - Follow up with Ortho in 2 weeks for wound check     Consultations This Hospital Stay   PHARMACY TO DOSE VANCO  ORTHOPAEDIC SURGERY ADULT/PEDS IP CONSULT  SOCIAL WORK IP CONSULT    Code Status   Full Code       The patient was  discussed with MD Anay Pool's Service  [facility] [address]  [address] [address]  Phone: [phone]  Fax: [phone]  ______________________________________________________________________    Physical Exam   Vital Signs: Temp: 98  F (36.7  C) Temp src: Oral BP: 96/55 Pulse: 68   Resp: 18 SpO2: 98 % O2 Device: None (Room air)    Weight: 140 lbs 0 oz    General: Alert, well-appearing, no acute distress   Pulm: Clear to auscultation bilaterally   CV: RRR, no murmur  Abd: soft, NTND, no masses  Ext: RUE in post-surgical dressing, C/D/I, SILT to fingers, able to move fingers. Warm, well perfused, 2+ BL radial pulses, no LE edema  Skin: No rash on limited skin exam  Psych: Mood appropriate to visit content, full affect, rational thought content and process      Primary Care Physician   [facility]    Discharge Orders      Reason for your hospital stay    You were admitted for an infection to your hand and tendons due to a cat bite. You had a procedure called an incision and drainage to help the infection to heal. Please continue your antibiotics after you leave the hospital.     Activity    Your activity upon discharge: activity as tolerated     Adult [facility] Follow-up and recommended labs and tests    1.) Primary doctor: Please follow up within 5-7 days. Recommend BMP.     2.) Ortho: Follow up in 2 weeks for wound check.     Appointments on [place] and/or [facility] (with [facility] or [facility] provider or service). Call [phone] if you haven't heard regarding these appointments within 7 days of discharge.     Diet    Follow this diet upon discharge: Regular diet       Significant Results and Procedures   Most Recent 3 CBC's:  Recent Labs   Lab Test 05/07/24  0540 05/06/24  1240   WBC 7.8 9.6   HGB 12.6 13.7   MCV 86 87    229     Most Recent 3 BMP's:  Recent Labs   Lab Test 05/07/24  0540 05/06/24  1240    136   POTASSIUM 3.9 3.8    CHLORIDE 104 104   CO2 25 22   BUN 10.6 11.2   CR 0.74 0.57   ANIONGAP 9 10   DANA 8.2* 8.6   GLC 77 95       Discharge Medications   Current Discharge Medication List        START taking these medications    Details   doxycycline hyclate (VIBRAMYCIN) 100 MG capsule Take 1 capsule (100 mg) by mouth every 12 hours for 9 days  Qty: 18 capsule, Refills: 0    Associated Diagnoses: Cat bite of right hand, initial encounter; Tenosynovitis of hand           CONTINUE these medications which have CHANGED    Details   amoxicillin-clavulanate (AUGMENTIN) 875-125 MG tablet Take 1 tablet by mouth 2 times daily for 9 days  Qty: 18 tablet, Refills: 0    Associated Diagnoses: Cat bite of right hand, initial encounter; Tenosynovitis of hand           CONTINUE these medications which have NOT CHANGED    Details   ibuprofen (ADVIL/MOTRIN) 600 MG tablet Take 1 tablet (600 mg) by mouth every 6 hours as needed  Qty: 30 tablet, Refills: 0      sertraline (ZOLOFT) 100 MG tablet Take 100 mg by mouth daily           Allergies   No Known Allergies

## 2024-05-09 LAB
BACTERIA WND CULT: NO GROWTH
BACTERIA WND CULT: NO GROWTH

## 2024-05-11 LAB
BACTERIA BLD CULT: NO GROWTH
BACTERIA BLD CULT: NO GROWTH

## 2024-05-14 LAB
BACTERIA WND CULT: NORMAL
BACTERIA WND CULT: NORMAL

## 2024-05-20 ENCOUNTER — OFFICE VISIT (OUTPATIENT)
Dept: ORTHOPEDICS | Facility: CLINIC | Age: 39
End: 2024-05-20
Payer: COMMERCIAL

## 2024-05-20 DIAGNOSIS — Z98.890 STATUS POST SURGERY: Primary | ICD-10-CM

## 2024-05-20 PROCEDURE — 99207 PR NO CHARGE NURSE ONLY: CPT

## 2024-05-20 NOTE — PROGRESS NOTES
Reason for visit:    Benita Harris came in to the clinic for a two week post op check.    Her surgery was done 5/7/2024 by Dr Torres.  She had a right forearm I&D.     Assessment:    The Surgical wounds were exposed and found to be well-healed; so the sutures were removed. Steri strips were applied. Minimal swelling noted. Benita is doing well. Only complaint is limited range of motion and end point motion pain in the wrist due to incisional pain. She complains of minor radial wrist numbness but states it does seem to be getting better.     Plan:     She may now get the incision wet. No need to cover with bandages.   I advised discontinued use of the brace. 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 Things that put heavy strain on her wrist and are repetitive motions will make pain and swelling linger and could risk re opening the incision. She may resume work as tolerated and yoga when pain free.   I instructed the patient on gentle ROM exercises she can do herself at home. Ice for pain or swelling.     She has an appointment to see Dr. Torres at 6 weeks post op and at that time Dr. Torres will determine further restrictions. She may cancel if doing well.    All questions were answered. She has our phone number and will call with additional questions or problems.    Jana Bills PA-C is the overseeing provider on site today.

## 2024-06-04 LAB
BACTERIA WND CULT: NO GROWTH
BACTERIA WND CULT: NO GROWTH

## 2024-06-18 ENCOUNTER — OFFICE VISIT (OUTPATIENT)
Dept: ORTHOPEDICS | Facility: CLINIC | Age: 39
End: 2024-06-18
Payer: COMMERCIAL
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
## 2024-06-18 DIAGNOSIS — S61.451A CAT BITE OF RIGHT HAND, INITIAL ENCOUNTER: Primary | ICD-10-CM

## 2024-06-18 PROCEDURE — 99213 OFFICE O/P EST LOW 20 MIN: CPT | Performed by: PHYSICIAN ASSISTANT

## 2024-06-18 NOTE — LETTER
6/18/2024      Benita Harris  [address]  [place] [address]      Dear Colleague,    Thank you for referring your patient, Benita Harris, to the [facility] [place]. Please see a copy of my visit note below.    Date of Service: Jun 18, 2024    Chief Complaint: Post operative follow up.     Date of Surgery: 5/7/24    Procedure Performed:  Right upper extremity irrigation and debridement of the distal third of the forearm with Dr. Torres for cat bite     Interval events: Benita Harris is a 38 year old female who presents today for a postoperative wound check.  Patient reports that over the last week she has increased redness, tenderness and inflammation around the surgical incision to her dorsal wrist.  Patient reports that she has been able to get back to most activities.  She does feel her range of motion is slightly decreased compared to the left.  She notes that just prior to onset of increased redness and pain she had return to doing yoga.  Denies any new wounds.    The past medical history was reviewed updated in the EMR. This includes medications, surgeries, social history, and review of systems.    Physical examination:  Well-developed, well-nourished and in no acute distress.  Alert and oriented to surroundings.  On examination of the  right dorsal wrist, prior surgical incision is well-healed, mildly hypertrophic and hyperemic.  There is no significant surrounding erythema.  No palpable fluctuance.  Mild tenderness palpation along the length of the incision.Sensation is intact in median, radial and ulnar nerve distributions. Patient can actively flex and extend all digits and thumb. The patient is able to make a full composite fist tolerates approximately 70 degrees of wrist extension, 70 degrees of wrist lection.  Nontender to palpation over wrist joint.      Assessment: 38 year old female who presents approximately 6 weeks status post I&D of the right dorsal wrist after cat  bite with Dr. Torres.  Now with a hypertrophic and tender surgical scar.  No evidence of acute infection today.      Plan: Reassurance given low concern for acute infection today.  Discussed signs and symptoms of worsening infection that prompt return to the clinic.  Offered referral to hand therapy for range of motion, scar massage.  Patient has follow-up scheduled up with Dr. Torres  in a few weeks, knows to contact us sooner with any questions or concerns.    RUBINA ZAMUDIO PA-C  June 18, 2024 3:12 PM   Orthopaedic Surgery

## 2024-06-18 NOTE — PROGRESS NOTES
Date of Service: Jun 18, 2024    Chief Complaint: Post operative follow up.     Date of Surgery: 5/7/24    Procedure Performed:  Right upper extremity irrigation and debridement of the distal third of the forearm with Dr. Torres for cat bite     Interval events: Benita Harris is a 38 year old female who presents today for a postoperative wound check.  Patient reports that over the last week she has increased redness, tenderness and inflammation around the surgical incision to her dorsal wrist.  Patient reports that she has been able to get back to most activities.  She does feel her range of motion is slightly decreased compared to the left.  She notes that just prior to onset of increased redness and pain she had return to doing yoga.  Denies any new wounds.    The past medical history was reviewed updated in the EMR. This includes medications, surgeries, social history, and review of systems.    Physical examination:  Well-developed, well-nourished and in no acute distress.  Alert and oriented to surroundings.  On examination of the  right dorsal wrist, prior surgical incision is well-healed, mildly hypertrophic and hyperemic.  There is no significant surrounding erythema.  No palpable fluctuance.  Mild tenderness palpation along the length of the incision.Sensation is intact in median, radial and ulnar nerve distributions. Patient can actively flex and extend all digits and thumb. The patient is able to make a full composite fist tolerates approximately 70 degrees of wrist extension, 70 degrees of wrist lection.  Nontender to palpation over wrist joint.      Assessment: 38 year old female who presents approximately 6 weeks status post I&D of the right dorsal wrist after cat bite with Dr. Torres.  Now with a hypertrophic and tender surgical scar.  No evidence of acute infection today.      Plan: Reassurance given low concern for acute infection today.  Discussed signs and symptoms of worsening infection that prompt  return to the clinic.  Offered referral to hand therapy for range of motion, scar massage.  Patient has follow-up scheduled up with Dr. Torres  in a few weeks, knows to contact us sooner with any questions or concerns.    RUBINA ZAMUDIO PA-C  June 18, 2024 3:12 PM   Orthopaedic Surgery

## 2024-06-18 NOTE — NURSING NOTE
Reason For Visit:   Chief Complaint   Patient presents with    Wound Check     Incision and drainage right distal third of forearm - Right, DOS: 5/7/2024.    Patient relates that she has been experiencing swelling and redness.        Primary MD: Marce, [facility]    Age: 38 year old    Pain Assessment  Patient Currently in Pain: Yes  Primary Pain Location:  (Right wrist)    Hand Dominance Evaluation  Hand Dominance: Right          QuickDASH Assessment       No data to display                   Current Outpatient Medications   Medication Sig Dispense Refill    ibuprofen (ADVIL/MOTRIN) 600 MG tablet Take 1 tablet (600 mg) by mouth every 6 hours as needed 30 tablet 0    sertraline (ZOLOFT) 100 MG tablet Take 100 mg by mouth daily         No Known Allergies    Tammy Li LPN

## 2024-07-08 ENCOUNTER — THERAPY VISIT (OUTPATIENT)
Dept: OCCUPATIONAL THERAPY | Facility: CLINIC | Age: 39
End: 2024-07-08
Attending: PHYSICIAN ASSISTANT
Payer: COMMERCIAL

## 2024-07-08 DIAGNOSIS — M25.531 RIGHT WRIST PAIN: Primary | ICD-10-CM
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
## 2024-07-08 DIAGNOSIS — S61.451A CAT BITE OF RIGHT HAND, INITIAL ENCOUNTER: ICD-10-CM

## 2024-07-08 PROCEDURE — 97535 SELF CARE MNGMENT TRAINING: CPT | Mod: GO | Performed by: OCCUPATIONAL THERAPIST

## 2024-07-08 PROCEDURE — 97165 OT EVAL LOW COMPLEX 30 MIN: CPT | Mod: GO | Performed by: OCCUPATIONAL THERAPIST

## 2024-07-08 NOTE — PROGRESS NOTES
OCCUPATIONAL THERAPY EVALUATION  Type of Visit: Evaluation    See electronic medical record for Abuse and Falls Screening details.    Diagnosis: R wrist pain, hypertrophic scar s/p cat bite.    Precautions: none  Patient is Right hand dominant    Procedure: Right upper extremity irrigation and debridement of the distal third of the forearm   DOS: 5/6/24  DOI 5/4/24    Subjective      Presenting condition or subjective complaint:  cat bite to left hand/wrist.   Date of onset: 05/06/24    Relevant medical history:   none  Dates & types of surgery:  As above    Prior diagnostic imaging/testing results:     none  Prior therapy history for the same diagnosis, illness or injury:    None    Prior Level of Function  Transfers: Independent  Ambulation: Independent  ADL: Independent  IADL: IND in all IADLs    Living Environment:   Patient is independent at home and there are no concerns about accessibility and mobility in the home.    Employment:    provides salon services 3x/week, owns/manages 2 salons.  Hobbies/Interests:  has 2 kids    Patient goals for therapy:      Pain assessment:  Patient has some hypersensitivity to the scar area.     Objective     POSTURE: WNL    EDEMA: Mild  Of the R wrist. Had been significant to the wrist and hand.      SCAR/WOUND:     SENSATION: some tingling at this point to the radial R wrist. Hypersensitive over the dorsal distal FA/ incision area.    ROM:   Wrist ROM  Left AROM Right AROM    Extension 82 55   Flexion 53 31 tightness mainly   Radial Deviation (RD) WNL WFL   Ulnar Deviation (UD) WNL WFL   UD with Th Flex     Supination 70 feels tighter 89   Pronation WNL WNL     OBSERVATIONS/APPEARANCE:   Side: right hand/upper extremity  Joint alignment: WNL    Color:  WNL    Joint enlargements:  None    Skin Appearance: Raised scar, about 1.5 inches.  Dorsal right distal forearm.   Other observations:  no guarding of the affected body part.  Minimal tenderness to palpation, except right over  and near the dorsal incision scar.     NEURAL TENSION TESTING: NA/deferred    STRENGTH  Pain-free /Pinch Test (pounds)   L R    51 32       Assessment & Plan   CLINICAL IMPRESSIONS  Medical Diagnosis: R wrist pain, hypertrophic scar s/p cat bite    Treatment Diagnosis: R wrist pain, hypertrophic scar s/p cat bite    Impression/Assessment: Pt is a 38 year old female presenting to Occupational Therapy due to condition as noted above.  The following significant findings have been identified: Impaired ROM and scar hypertrophy and sensitivity .  These identified deficits interfere with their ability to perform self care tasks and work tasks as compared to previous level of function.     Clinical Decision Making (Complexity):  Assessment of Occupational Performance: 3-5 Performance Deficits  Occupational Performance Limitations: hygiene and grooming, care of others, work, and leisure activities  Clinical Decision Making (Complexity): Low complexity    PLAN OF CARE  Treatment Interventions:  Modalities:  US and Paraffin  Therapeutic Exercise:  AROM and PROM  Neuromuscular re-education:  Desensitization and Kinesiotaping  Manual Techniques:  Scar mobilization  Self Care:  Self Care Tasks, Ergonomic Considerations, and Work Tasks    Long Term Goals   OT Goal 1  Goal Identifier: 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  Target Date: 09/08/24      Frequency of Treatment: 1 time per week  Duration of Treatment: For 3 weeks, then 2 times per month for 1 month     Recommended Referrals to Other Professionals:  N/A  Education Assessment: Learner/Method: Patient;No Barriers to Learning;Listening;Reading;Demonstration;Pictures/Video     Risks and benefits of evaluation/treatment have been explained.   Patient/Family/caregiver agrees  with Plan of Care.     Evaluation Time:    OT Danielle, Low Complexity Minutes (75036): 25       Signing Clinician: Lorri Kerr OT

## 2024-07-09 ENCOUNTER — OFFICE VISIT (OUTPATIENT)
Dept: ORTHOPEDICS | Facility: CLINIC | Age: 39
End: 2024-07-09
Payer: COMMERCIAL

## 2024-07-09 DIAGNOSIS — M25.531 RIGHT WRIST PAIN: Primary | ICD-10-CM

## 2024-07-09 PROCEDURE — 99212 OFFICE O/P EST SF 10 MIN: CPT | Performed by: ORTHOPAEDIC SURGERY

## 2024-07-09 ASSESSMENT — PATIENT HEALTH QUESTIONNAIRE - PHQ9: SUM OF ALL RESPONSES TO PHQ QUESTIONS 1-9: 0

## 2024-07-09 NOTE — PROGRESS NOTES
Chief complaint: Status post right distal forearm irrigation debridement performed on May 6, 2024    Patient presents today for further follow-up.  Reports to be doing well.  Reports still to have some wrist motion limitations in spite of working with occupational therapy.  Denies to have had any recurrence of the infection and reports to have off antibiotics for now a number of weeks.    On today's exam she presents with a slightly keloid scar along the dorsal ulnar aspect of the wrist.  There is no signs of infection inflammation.  She presents with reasonable passive range of motion which is somewhat limited for active range of motion.    Assessment: Status post right distal forearm irrigation and debridement    Plan: Discussed with the patient that at this point I encouraged her to continue working with occupational therapy is much as possible in order to avoid any long-term stiffness.    From an infection perspective I think she is completely resolved.    Encouraged the patient to visit with us if she needs more orders for therapy.  Otherwise she will follow-up on a as needed basis.    All questions were answered.

## 2024-07-09 NOTE — LETTER
7/9/2024      Benita Harris  [address]  [place] [address]      Dear Colleague,    Thank you for referring your patient, Benita Harris, to the [facility] [place]. Please see a copy of my visit note below.    Chief complaint: Status post right distal forearm irrigation debridement performed on May 6, 2024    Patient presents today for further follow-up.  Reports to be doing well.  Reports still to have some wrist motion limitations in spite of working with occupational therapy.  Denies to have had any recurrence of the infection and reports to have off antibiotics for now a number of weeks.    On today's exam she presents with a slightly keloid scar along the dorsal ulnar aspect of the wrist.  There is no signs of infection inflammation.  She presents with reasonable passive range of motion which is somewhat limited for active range of motion.    Assessment: Status post right distal forearm irrigation and debridement    Plan: Discussed with the patient that at this point I encouraged her to continue working with occupational therapy is much as possible in order to avoid any long-term stiffness.    From an infection perspective I think she is completely resolved.    Encouraged the patient to visit with us if she needs more orders for therapy.  Otherwise she will follow-up on a as needed basis.    All questions were answered.    Sincerely,        Drew Torres MD

## 2024-07-09 NOTE — NURSING NOTE
Reason For Visit:   Chief Complaint   Patient presents with    RECHECK     Follow up right distal forearm. S/p Incision and drainage right distal third of forearm DOS 5/6/24. Still notes swelling and limited ROM         38 year old  1985      Primary MD: [facility], [facility]        There were no vitals taken for this visit.    Pain Assessment  Patient Currently in Pain: Yes  0-10 Pain Scale: 5  Primary Pain Location:  (right wrist)            Yovani Scott ATC

## 2024-07-15 ENCOUNTER — THERAPY VISIT (OUTPATIENT)
Dept: OCCUPATIONAL THERAPY | Facility: CLINIC | Age: 39
End: 2024-07-15
Attending: PHYSICIAN ASSISTANT
Payer: COMMERCIAL

## 2024-07-15 DIAGNOSIS — M25.531 RIGHT WRIST PAIN: Primary | ICD-10-CM

## 2024-07-15 PROCEDURE — 97110 THERAPEUTIC EXERCISES: CPT | Mod: GO | Performed by: OCCUPATIONAL THERAPIST

## 2024-07-22 ENCOUNTER — THERAPY VISIT (OUTPATIENT)
Dept: OCCUPATIONAL THERAPY | Facility: CLINIC | Age: 39
End: 2024-07-22
Attending: PHYSICIAN ASSISTANT
Payer: COMMERCIAL

## 2024-07-22 DIAGNOSIS — M25.531 RIGHT WRIST PAIN: Primary | ICD-10-CM

## 2024-07-22 PROCEDURE — 97140 MANUAL THERAPY 1/> REGIONS: CPT | Mod: GO | Performed by: OCCUPATIONAL THERAPIST

## 2024-07-22 PROCEDURE — 97035 APP MDLTY 1+ULTRASOUND EA 15: CPT | Mod: GO | Performed by: OCCUPATIONAL THERAPIST

## 2024-07-22 PROCEDURE — 97110 THERAPEUTIC EXERCISES: CPT | Mod: GO | Performed by: OCCUPATIONAL THERAPIST

## 2024-07-28 ENCOUNTER — HEALTH MAINTENANCE LETTER (OUTPATIENT)
Age: 39
End: 2024-07-28

## 2024-07-29 ENCOUNTER — THERAPY VISIT (OUTPATIENT)
Dept: OCCUPATIONAL THERAPY | Facility: CLINIC | Age: 39
End: 2024-07-29
Payer: COMMERCIAL

## 2024-07-29 DIAGNOSIS — M25.531 RIGHT WRIST PAIN: Primary | ICD-10-CM

## 2024-07-29 PROCEDURE — 97035 APP MDLTY 1+ULTRASOUND EA 15: CPT | Mod: GO | Performed by: OCCUPATIONAL THERAPIST

## 2024-07-29 PROCEDURE — 97110 THERAPEUTIC EXERCISES: CPT | Mod: GO | Performed by: OCCUPATIONAL THERAPIST

## 2024-07-29 PROCEDURE — 97140 MANUAL THERAPY 1/> REGIONS: CPT | Mod: GO | Performed by: OCCUPATIONAL THERAPIST

## 2024-08-05 ENCOUNTER — THERAPY VISIT (OUTPATIENT)
Dept: OCCUPATIONAL THERAPY | Facility: CLINIC | Age: 39
End: 2024-08-05
Payer: COMMERCIAL

## 2024-08-05 DIAGNOSIS — M25.531 RIGHT WRIST PAIN: Primary | ICD-10-CM

## 2024-08-05 PROCEDURE — 97110 THERAPEUTIC EXERCISES: CPT | Mod: GO | Performed by: OCCUPATIONAL THERAPIST

## 2024-08-05 PROCEDURE — 97140 MANUAL THERAPY 1/> REGIONS: CPT | Mod: GO | Performed by: OCCUPATIONAL THERAPIST

## 2024-08-05 PROCEDURE — 97035 APP MDLTY 1+ULTRASOUND EA 15: CPT | Mod: GO | Performed by: OCCUPATIONAL THERAPIST

## 2024-08-12 ENCOUNTER — THERAPY VISIT (OUTPATIENT)
Dept: OCCUPATIONAL THERAPY | Facility: CLINIC | Age: 39
End: 2024-08-12
Payer: COMMERCIAL

## 2024-08-12 DIAGNOSIS — M25.531 RIGHT WRIST PAIN: Primary | ICD-10-CM

## 2024-08-12 PROCEDURE — 97035 APP MDLTY 1+ULTRASOUND EA 15: CPT | Mod: GO | Performed by: OCCUPATIONAL THERAPIST

## 2024-08-12 PROCEDURE — 97140 MANUAL THERAPY 1/> REGIONS: CPT | Mod: GO | Performed by: OCCUPATIONAL THERAPIST

## 2024-08-19 ENCOUNTER — THERAPY VISIT (OUTPATIENT)
Dept: OCCUPATIONAL THERAPY | Facility: CLINIC | Age: 39
End: 2024-08-19
Payer: COMMERCIAL

## 2024-08-19 DIAGNOSIS — M25.531 RIGHT WRIST PAIN: Primary | ICD-10-CM

## 2024-08-19 PROCEDURE — 97140 MANUAL THERAPY 1/> REGIONS: CPT | Mod: GO | Performed by: OCCUPATIONAL THERAPIST

## 2024-08-19 PROCEDURE — 97035 APP MDLTY 1+ULTRASOUND EA 15: CPT | Mod: GO | Performed by: OCCUPATIONAL THERAPIST

## 2024-08-29 ENCOUNTER — THERAPY VISIT (OUTPATIENT)
Dept: OCCUPATIONAL THERAPY | Facility: CLINIC | Age: 39
End: 2024-08-29
Payer: COMMERCIAL

## 2024-08-29 DIAGNOSIS — M25.531 RIGHT WRIST PAIN: Primary | ICD-10-CM

## 2024-08-29 PROCEDURE — 97140 MANUAL THERAPY 1/> REGIONS: CPT | Mod: GO | Performed by: OCCUPATIONAL THERAPIST

## 2024-08-29 PROCEDURE — 97035 APP MDLTY 1+ULTRASOUND EA 15: CPT | Mod: GO | Performed by: OCCUPATIONAL THERAPIST

## 2024-09-09 ENCOUNTER — THERAPY VISIT (OUTPATIENT)
Dept: OCCUPATIONAL THERAPY | Facility: CLINIC | Age: 39
End: 2024-09-09
Payer: COMMERCIAL

## 2024-09-09 DIAGNOSIS — M25.531 RIGHT WRIST PAIN: Primary | ICD-10-CM

## 2024-09-09 PROCEDURE — 97535 SELF CARE MNGMENT TRAINING: CPT | Mod: GO | Performed by: OCCUPATIONAL THERAPIST

## 2024-09-09 PROCEDURE — 97035 APP MDLTY 1+ULTRASOUND EA 15: CPT | Mod: GO | Performed by: OCCUPATIONAL THERAPIST

## 2024-09-16 ENCOUNTER — THERAPY VISIT (OUTPATIENT)
Dept: OCCUPATIONAL THERAPY | Facility: CLINIC | Age: 39
End: 2024-09-16
Payer: COMMERCIAL

## 2024-09-16 DIAGNOSIS — M25.531 RIGHT WRIST PAIN: Primary | ICD-10-CM

## 2024-09-16 PROCEDURE — 97035 APP MDLTY 1+ULTRASOUND EA 15: CPT | Mod: GO | Performed by: OCCUPATIONAL THERAPIST

## 2024-09-16 PROCEDURE — 97140 MANUAL THERAPY 1/> REGIONS: CPT | Mod: GO | Performed by: OCCUPATIONAL THERAPIST

## 2024-09-17 NOTE — PROGRESS NOTES
SOAP note information for 9/16/2024.  Please refer to the daily flowsheet for treatment today, total treatment time and time spent performing 1:1 timed codes.       Photos demonstrating current look of scar

## 2024-09-23 ENCOUNTER — THERAPY VISIT (OUTPATIENT)
Dept: OCCUPATIONAL THERAPY | Facility: CLINIC | Age: 39
End: 2024-09-23
Payer: COMMERCIAL

## 2024-09-23 DIAGNOSIS — M25.531 RIGHT WRIST PAIN: Primary | ICD-10-CM

## 2024-09-23 PROCEDURE — 97035 APP MDLTY 1+ULTRASOUND EA 15: CPT | Mod: GO | Performed by: OCCUPATIONAL THERAPIST

## 2024-09-23 PROCEDURE — 97140 MANUAL THERAPY 1/> REGIONS: CPT | Mod: GO | Performed by: OCCUPATIONAL THERAPIST

## 2024-09-30 ENCOUNTER — THERAPY VISIT (OUTPATIENT)
Dept: OCCUPATIONAL THERAPY | Facility: CLINIC | Age: 39
End: 2024-09-30
Payer: COMMERCIAL

## 2024-09-30 DIAGNOSIS — M25.531 RIGHT WRIST PAIN: Primary | ICD-10-CM

## 2024-09-30 PROCEDURE — 97035 APP MDLTY 1+ULTRASOUND EA 15: CPT | Mod: GO | Performed by: OCCUPATIONAL THERAPIST

## 2024-09-30 PROCEDURE — 97140 MANUAL THERAPY 1/> REGIONS: CPT | Mod: GO | Performed by: OCCUPATIONAL THERAPIST

## 2024-10-14 ENCOUNTER — THERAPY VISIT (OUTPATIENT)
Dept: OCCUPATIONAL THERAPY | Facility: CLINIC | Age: 39
End: 2024-10-14
Payer: COMMERCIAL

## 2024-10-14 DIAGNOSIS — M25.531 RIGHT WRIST PAIN: Primary | ICD-10-CM

## 2024-10-14 PROCEDURE — 97035 APP MDLTY 1+ULTRASOUND EA 15: CPT | Mod: GO | Performed by: OCCUPATIONAL THERAPIST

## 2024-10-14 PROCEDURE — 97140 MANUAL THERAPY 1/> REGIONS: CPT | Mod: GO | Performed by: OCCUPATIONAL THERAPIST

## 2024-10-21 ENCOUNTER — THERAPY VISIT (OUTPATIENT)
Dept: OCCUPATIONAL THERAPY | Facility: CLINIC | Age: 39
End: 2024-10-21
Payer: COMMERCIAL

## 2024-10-21 DIAGNOSIS — M25.531 RIGHT WRIST PAIN: Primary | ICD-10-CM

## 2024-10-21 PROCEDURE — 97035 APP MDLTY 1+ULTRASOUND EA 15: CPT | Mod: GO | Performed by: OCCUPATIONAL THERAPIST

## 2024-10-21 PROCEDURE — 97140 MANUAL THERAPY 1/> REGIONS: CPT | Mod: GO | Performed by: OCCUPATIONAL THERAPIST

## 2024-10-28 ENCOUNTER — THERAPY VISIT (OUTPATIENT)
Dept: OCCUPATIONAL THERAPY | Facility: CLINIC | Age: 39
End: 2024-10-28
Payer: COMMERCIAL

## 2024-10-28 DIAGNOSIS — M25.531 RIGHT WRIST PAIN: Primary | ICD-10-CM

## 2024-10-28 PROCEDURE — 97035 APP MDLTY 1+ULTRASOUND EA 15: CPT | Mod: GO | Performed by: OCCUPATIONAL THERAPIST

## 2024-10-28 PROCEDURE — 97110 THERAPEUTIC EXERCISES: CPT | Mod: GO | Performed by: OCCUPATIONAL THERAPIST

## 2024-10-29 DIAGNOSIS — L91.0 HYPERTROPHIC SCAR: ICD-10-CM

## 2024-10-29 DIAGNOSIS — M25.531 RIGHT WRIST PAIN: Primary | ICD-10-CM

## 2024-10-29 DIAGNOSIS — Z98.890 STATUS POST SURGERY: ICD-10-CM

## 2024-10-30 RX ORDER — DEXAMETHASONE SODIUM PHOSPHATE 4 MG/ML
6-8 INJECTION, SOLUTION INTRA-ARTICULAR; INTRALESIONAL; INTRAMUSCULAR; INTRAVENOUS; SOFT TISSUE SEE ADMIN INSTRUCTIONS
Qty: 6 ML | Refills: 1 | Status: SHIPPED | OUTPATIENT
Start: 2024-10-30

## 2024-11-04 ENCOUNTER — THERAPY VISIT (OUTPATIENT)
Dept: OCCUPATIONAL THERAPY | Facility: CLINIC | Age: 39
End: 2024-11-04
Payer: COMMERCIAL

## 2024-11-04 DIAGNOSIS — M25.531 RIGHT WRIST PAIN: Primary | ICD-10-CM

## 2024-11-04 PROCEDURE — 97035 APP MDLTY 1+ULTRASOUND EA 15: CPT | Mod: GO | Performed by: OCCUPATIONAL THERAPIST

## 2024-11-04 PROCEDURE — 97140 MANUAL THERAPY 1/> REGIONS: CPT | Mod: GO | Performed by: OCCUPATIONAL THERAPIST

## 2024-11-11 ENCOUNTER — THERAPY VISIT (OUTPATIENT)
Dept: OCCUPATIONAL THERAPY | Facility: CLINIC | Age: 39
End: 2024-11-11
Payer: COMMERCIAL

## 2024-11-11 DIAGNOSIS — M25.531 RIGHT WRIST PAIN: Primary | ICD-10-CM

## 2024-11-11 PROCEDURE — 97033 APP MDLTY 1+IONTPHRSIS EA 15: CPT | Mod: GO | Performed by: OCCUPATIONAL THERAPIST

## 2024-11-18 ENCOUNTER — THERAPY VISIT (OUTPATIENT)
Dept: OCCUPATIONAL THERAPY | Facility: CLINIC | Age: 39
End: 2024-11-18
Payer: COMMERCIAL

## 2024-11-18 DIAGNOSIS — M25.531 RIGHT WRIST PAIN: Primary | ICD-10-CM

## 2024-11-18 PROCEDURE — 97033 APP MDLTY 1+IONTPHRSIS EA 15: CPT | Mod: GO | Performed by: OCCUPATIONAL THERAPIST

## 2024-11-25 ENCOUNTER — THERAPY VISIT (OUTPATIENT)
Dept: OCCUPATIONAL THERAPY | Facility: CLINIC | Age: 39
End: 2024-11-25
Payer: COMMERCIAL

## 2024-11-25 DIAGNOSIS — M25.531 RIGHT WRIST PAIN: Primary | ICD-10-CM

## 2024-11-25 PROCEDURE — 97033 APP MDLTY 1+IONTPHRSIS EA 15: CPT | Mod: GO | Performed by: OCCUPATIONAL THERAPIST

## 2024-12-02 ENCOUNTER — THERAPY VISIT (OUTPATIENT)
Dept: OCCUPATIONAL THERAPY | Facility: CLINIC | Age: 39
End: 2024-12-02
Payer: COMMERCIAL

## 2024-12-02 DIAGNOSIS — M25.531 RIGHT WRIST PAIN: Primary | ICD-10-CM

## 2024-12-02 PROCEDURE — 97033 APP MDLTY 1+IONTPHRSIS EA 15: CPT | Mod: GO | Performed by: OCCUPATIONAL THERAPIST

## 2024-12-09 ENCOUNTER — THERAPY VISIT (OUTPATIENT)
Dept: OCCUPATIONAL THERAPY | Facility: CLINIC | Age: 39
End: 2024-12-09
Payer: COMMERCIAL

## 2024-12-09 DIAGNOSIS — M25.531 RIGHT WRIST PAIN: Primary | ICD-10-CM

## 2024-12-09 PROCEDURE — 97033 APP MDLTY 1+IONTPHRSIS EA 15: CPT | Mod: GO | Performed by: SPECIALIST/TECHNOLOGIST

## 2024-12-15 ENCOUNTER — HEALTH MAINTENANCE LETTER (OUTPATIENT)
Age: 39
End: 2024-12-15

## 2024-12-16 ENCOUNTER — THERAPY VISIT (OUTPATIENT)
Dept: OCCUPATIONAL THERAPY | Facility: CLINIC | Age: 39
End: 2024-12-16
Payer: COMMERCIAL

## 2024-12-16 DIAGNOSIS — M25.531 RIGHT WRIST PAIN: Primary | ICD-10-CM

## 2024-12-16 PROCEDURE — 97033 APP MDLTY 1+IONTPHRSIS EA 15: CPT | Mod: GO | Performed by: OCCUPATIONAL THERAPIST

## 2024-12-16 NOTE — PROGRESS NOTES
"Anesthesia Transfer of Care Note    Patient: Chelsie Lee    Procedure(s) Performed: Procedure(s) (LRB):  COLON (N/A)    Patient location: GI    Anesthesia Type: MAC    Transport from OR: Transported from OR on room air with adequate spontaneous ventilation    Post pain: adequate analgesia    Post assessment: no apparent anesthetic complications    Post vital signs: stable    Level of consciousness: awake    Nausea/Vomiting: no nausea/vomiting    Complications: none    Transfer of care protocol was followed      Last vitals:   Visit Vitals  /72   Pulse 83   Temp 37 °C (98.6 °F)   Resp 16   Ht 5' 4" (1.626 m)   Wt 44 kg (97 lb)   SpO2 98%   Breastfeeding No   BMI 16.65 kg/m²     " Hand Therapy information for 12/16/2024.  Please refer to the daily flowsheet for treatment today, total treatment time and time spent performing 1:1 timed codes.       Scar: 3 cm long, 0.5 cm at the widest

## 2024-12-19 ENCOUNTER — TELEPHONE (OUTPATIENT)
Dept: ORTHOPEDICS | Facility: CLINIC | Age: 39
End: 2024-12-19
Payer: COMMERCIAL

## 2024-12-19 NOTE — TELEPHONE ENCOUNTER
Patient confirmed scheduled appointment:  Date: 2/4/25  Time: 9:00am  Visit type: New Hand  Provider: Dr. Fung

## 2024-12-19 NOTE — TELEPHONE ENCOUNTER
----- Message from Glen RODAS sent at 12/18/2024  1:57 PM CST -----  Regarding: FW: Referral  Could you please reach out to patient and help them get scheduled with Dr Fung for possible scar revision surgery consult.  He has openings 12/24/24 or 12/31/24 or whatever works for the patient.      GLEN LIMON, ATC  ----- Message -----  From: Thu Reynoso PA-C  Sent: 12/18/2024   1:01 PM CST  To: Lawanda Mayorga, ELINA; Glen Limon ATC  Subject: FW: Referral                                     Can we get this patient in to see Dr. Fung for a scar revision consult? I dont think she needs a new referral?  ----- Message -----  From: Lorri Kerr OT  Sent: 12/18/2024  12:51 PM CST  To: Thu Reynoso PA-C  Subject: Referral                                         Would you be up for putting in a referral for this patient to see Dr Fung?    She would like to consult about her scar.  It has not changed much.  I put a photo in my last note.    A functional issue is that it almost seems like the scar is possibly adhered to an extensor tendon of the wrist.  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    Thanks,  Maricarmen

## 2024-12-23 ENCOUNTER — THERAPY VISIT (OUTPATIENT)
Dept: OCCUPATIONAL THERAPY | Facility: CLINIC | Age: 39
End: 2024-12-23
Payer: COMMERCIAL

## 2024-12-23 DIAGNOSIS — M25.531 RIGHT WRIST PAIN: Primary | ICD-10-CM

## 2024-12-23 PROCEDURE — 97033 APP MDLTY 1+IONTPHRSIS EA 15: CPT | Mod: GO | Performed by: OCCUPATIONAL THERAPIST

## 2025-01-13 ENCOUNTER — THERAPY VISIT (OUTPATIENT)
Dept: OCCUPATIONAL THERAPY | Facility: CLINIC | Age: 40
End: 2025-01-13
Payer: COMMERCIAL

## 2025-01-13 DIAGNOSIS — M25.531 RIGHT WRIST PAIN: Primary | ICD-10-CM

## 2025-01-13 PROCEDURE — 97033 APP MDLTY 1+IONTPHRSIS EA 15: CPT | Mod: GO | Performed by: SPECIALIST/TECHNOLOGIST

## 2025-01-20 ENCOUNTER — THERAPY VISIT (OUTPATIENT)
Dept: OCCUPATIONAL THERAPY | Facility: CLINIC | Age: 40
End: 2025-01-20
Payer: COMMERCIAL

## 2025-01-20 DIAGNOSIS — M25.531 RIGHT WRIST PAIN: Primary | ICD-10-CM

## 2025-01-20 PROCEDURE — 97033 APP MDLTY 1+IONTPHRSIS EA 15: CPT | Mod: GO | Performed by: OCCUPATIONAL THERAPIST

## 2025-01-27 ENCOUNTER — THERAPY VISIT (OUTPATIENT)
Dept: OCCUPATIONAL THERAPY | Facility: CLINIC | Age: 40
End: 2025-01-27
Payer: COMMERCIAL

## 2025-01-27 DIAGNOSIS — M25.531 RIGHT WRIST PAIN: Primary | ICD-10-CM

## 2025-01-27 PROCEDURE — 97033 APP MDLTY 1+IONTPHRSIS EA 15: CPT | Mod: GO | Performed by: OCCUPATIONAL THERAPIST

## 2025-01-27 PROCEDURE — 97535 SELF CARE MNGMENT TRAINING: CPT | Mod: GO | Performed by: OCCUPATIONAL THERAPIST

## 2025-01-27 NOTE — PROGRESS NOTES
Hand Therapy Progress Note  Please refer to the daily flowsheet for treatment today, total treatment time and time spent performing 1:1 timed codes.       Current Date:  1/27/2025 01/27/25 0500   Appointment Info   Treating Provider Lorri Kerr MS, OTR/L, CHT   Total/Authorized Visits 25 POC   Visits Used 23   Medical Diagnosis R wrist pain, hypertrophic scar s/p cat bite   OT Tx Diagnosis R wrist pain, hypertrophic scar s/p cat bite   Precautions/Limitations None   Other pertinent information using scar pad mainly   Progress Note/Certification   Onset of Illness/Injury or Date of Surgery 05/06/24   Therapy Frequency 1 time per week   Predicted Duration For 1 month   Progress Note Due Date 02/27/25   Progress Note Completed Date 01/27/25   OT Goal 1   Goal Identifier 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   Goal Progress Extend goal due dates based on progress rate to this point.   Target Date 01/23/25   Subjective Report   Subjective Report Thinking of getting a new wrist brace.  Right lateral elbow pain continues.  Scapular pain as well.  The elbow pain was very mild, occasional before the wrist issue.   Objective Measures   Objective Measures Objective Measure 1;Objective Measure 2;Hand Obj Measures;Objective Measure 3;Objective Measure 4   Hand Objective Measures ROM   ROM Wrist ROM   Objective Measure 1   Objective Measure Tenderness   Details R LEP, but more so to the anconeous area.   Objective Measure 2   Objective Measure FA tenderness   Details ext wad, PIN site   Objective Measure 3   Objective Measure R shoulder adduction   Details sore to scapula, top of shoulder   OT Modalities   OT Modalities Iontophoresis   Iontophoresis   Iontophoresis, Minutes (22222) 15 Minutes   Treatment Detail Placed patch  directly over scar at distal right forearm/dorsal wrist. Pad/medication portion placed longitudinally.   Iontophoresis -Type Electrode   Intensity 3.8 mA   Frequency 40 mA*min   Location Dorsal wrist/distal forearm   Electrode Size medium  (2 mL capacity)   Medication Slightly less than 2 mLDexamethasone   Patient Response/Progress Pt has been wearing the patch an hour after tx. Some increasing pain in lateral elbow over last couple weeks   Treatment Interventions (OT)   Interventions Self Care/Home Management   Self Care/Home Management   Self-Care/Home Mgmt/ADL, Compensatory, Meal Prep Minutes (78044) 10 Minutes   Self Care 1 Performed clinical reassessment, as noted above, in order to drive decision making for treatment and home recommendations.   PTRx Self Care 1 Kinesiotape application for radial nerve irritation in the forearm.   PTRx Self Care 1 - Details Right forearm. Mid dorsal forearm, Y strip, light tension, ending just above the lateral epicondyle.   PTRx Self Care 2 Also a shorter Y strip starting at the medial forearm, 50% tension, ending at the lateral forearm.   PTRx Self Care 2 - Details Recommend removing it about 3 days to check skin.   Skilled Intervention Therapist integrated the assessment of the patient's unique needs and performance difficulties into treatment, in order to guide them towards appropriate interventions to optimize pain free function.   Education   Learner/Method Patient;No Barriers to Learning;Listening;Reading;Demonstration;Pictures/Video   Plan   Home program PT Rx on phone.   Plan for next session Iontophoresis. Don't need to do waiver (done already to cover multiple tx)   Comments   Comments Please note: This documentation was generated by keyboarding and dictation with voice recognition software. There may be errors that have gone undetected. Please consider this when reviewing the chart and contact writer if there are concerns.         Assessment/Plan:  Response to  therapy has been improvement to: Assisting to manage symptoms, and building a home program to address deficits and manage symptoms.  Pt continues to present with pain, weakness/loss of strength, and decreased activity tolerance which is impacting their function.  Patient has another MD consult coming up next week  Patient requires further skilled therapy in order to facilitate return to function at the highest expected level, given their presenting diagnosis.    Overall Assessment:  Patient would benefit from continued therapy to achieve rehab potential  STG/LTG:  See above for details and updates.

## 2025-02-04 ENCOUNTER — ANCILLARY PROCEDURE (OUTPATIENT)
Dept: GENERAL RADIOLOGY | Facility: CLINIC | Age: 40
End: 2025-02-04
Attending: STUDENT IN AN ORGANIZED HEALTH CARE EDUCATION/TRAINING PROGRAM
Payer: COMMERCIAL

## 2025-02-04 ENCOUNTER — OFFICE VISIT (OUTPATIENT)
Dept: ORTHOPEDICS | Facility: CLINIC | Age: 40
End: 2025-02-04
Payer: COMMERCIAL

## 2025-02-04 DIAGNOSIS — M25.531 RIGHT WRIST PAIN: Primary | ICD-10-CM

## 2025-02-04 DIAGNOSIS — M25.531 RIGHT WRIST PAIN: ICD-10-CM

## 2025-02-04 PROCEDURE — 73070 X-RAY EXAM OF ELBOW: CPT | Mod: RT | Performed by: RADIOLOGY

## 2025-02-04 RX ADMIN — TRIAMCINOLONE ACETONIDE 20 MG: 40 INJECTION, SUSPENSION INTRA-ARTICULAR; INTRAMUSCULAR at 10:40

## 2025-02-04 RX ADMIN — LIDOCAINE HYDROCHLORIDE 0.5 ML: 10 INJECTION, SOLUTION EPIDURAL; INFILTRATION; INTRACAUDAL; PERINEURAL at 10:40

## 2025-02-04 NOTE — LETTER
2/4/2025      Benita Harris  [address]  [place] [address]      Dear Colleague,    Thank you for referring your patient, Benita Harris, to the [facility] [place]. Please see a copy of my visit note below.    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  Patient initially was bitten by a cat on 5/4/24, has had a soft tissue distal dorsal forearm washout on 5 and appropriate antibiotic treatment, no indication of arthrotomy or joint space infection at review of operative notes.  Since the patient has been examined working on right wrist extension which has been noted to be limited to 60 degrees on active motion, working with OT hand therapist for the past 6 months.  Pain at her lateral epicondyle is dull, achy and worse with activity, made better with rest. She describes no sensory changes or deficits.  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       ROS: Negative, see HPI  PMH: Nondiabetic  PSH: Right dorsal distal third forearm radial washout on 5/7 with Dr. Torres  Medications: No blood thinners  Allergies: None  SH: Nonsmoker, denies any tobacco or nicotine use  FH: No bleeding or clotting issues, or problems with anesthesia     Examination:  Nonlabored breathing  Not distressed  Hypertrophic raised vertical 3 cm scar along the right dorsal radial wrist, tender to palpation, no bony tenderness at the wrist, mild tenderness at the lateral epicondylar complex, active extension of the right wrist limited to 60 degrees, full passive extension.     XR right elbow: No radiopaque mass or foreign body.  No fractures or dislocations.     A/P: 39-year-old female  right-hand-dominant non-smoker presenting with a hypertrophic scar along her right dorsal radial wrist as well as tenderness along the lateral elbow epicondyle complex and extensor retinaculum.    -Discussed options for management for her hypertrophic scar.  Given evidence of redness, raised, and tender nature of scar, it would be reasonable to inject steroid into this region to provide relief and promote further scar remodeling. We can plan for several serial intralesional steroid injections to help her symptoms.  We discussed risks and benefits of injecting steroid including hypopigmentation and fat atrophy.  Despite these risks, patient consented to the procedure.  Injected 1.5 cc of 1% lidocaine and 20 mg Kenalog 1:1.  Patient tolerated the procedure well.  We also discussed continued scar massage as well as scar treatment for scar management.  -Discussed conservative management of intermittent tenderness of her lateral elbow epicondyle complex as well as extensor mechanism, including rest, activity modification in general, nd NSAID medications.  - All questions answered.   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  Discussed options for management.  Since the scar is still immature, it would not be unreasonable to inject intralesional steroid.  Patient would like this.  There is also some radiating pain extending from the scar up the forearm, which may be attributable to the tender scar.  We did obtain an XR of the right elbow with no osseous abnormality or signs of arthritis.  Full right active elbow motion with no tenderness over the right lateral elbow complex, radiocapitellar joint, medial or lateral humeral  epicondyles, or olecranon.    Offered a right dorsal wrist intralesional scar steroid injection. Patient would like this. Discussed the risks of steroid injections, including but not limited to: infection, bleeding, pain, injury to tendons or nerves, fat atrophy, skin depigmentation. Despite these risks, patient consents to steroid injection. Cleansed the skin with Chlorhexidine and 1:1 mixture of 20 mg Kenalog and 1% lidocaine was injected into each treatment area/joint. Patient tolerated the procedure with no issues and experienced immediate relief.      Instructed the patient to conduct scar treatment with silicone sheeting and/or gentle circular massage with use of silicone-based ointment versus Bio-oil.     Return to clinic in 6 weeks for reevaluation.    Patient seen and examined with student/resident/fellow. All time spent on this encounter is exclusive from any time spent by the student/resident/fellow.    A total of 30 minutes was devoted to review of chart, direct face-to-face patient counseling and documentation during and on the day of this encounter, exclusive of any procedure performed.    Kt Fung MD, PhD, FACS        Hand / Upper Extremity Injection/Arthrocentesis    Date/Time: 2/4/2025 10:40 AM    Performed by: Kt Fung MD  Authorized by: Kt Fung MD    Indications:  Pain  Needle Size:  27 G  Guidance: landmark     Condition comment:  Right dorsal wrist scar injection    Medications:  20 mg triamcinolone 40 MG/ML; 0.5 mL lidocaine (PF) 1 %  Outcome:  Tolerated well, no immediate complications  Procedure discussed: discussed risks, benefits, and alternatives    Consent Given by:  Patient  Timeout: timeout called immediately prior to procedure    Prep: patient was prepped and draped in usual sterile fashion        [facility] [address]  [address] [facility] [address] [phone]  Dept:  [phone]  ______________________________________________________________________________    Patient: Benita Harris   : 1985   MRN: [id number]   2025    INVASIVE PROCEDURE SAFETY CHECKLIST    Date: 2025   Procedure: Right dorsal wrist scar steroid injection  Patient Name: Benita Harris  MRN: [id number]  YOB: 1985    Action: Complete sections as appropriate. Any discrepancy results in a HARD COPY until resolved.     PRE PROCEDURE:  Patient ID verified with 2 identifiers (name and  or MRN): Yes  Procedure and site verified with patient/designee (when able): Yes  Accurate consent documentation in medical record: Yes  H&P (or appropriate assessment) documented in medical record: Yes  H&P must be up to 20 days prior to procedure and updates within 24 hours of procedure as applicable: Yes  Relevant diagnostic and radiology test results appropriately labeled and displayed as applicable: NA  Procedure site(s) marked with provider initials: NA    TIMEOUT:  Time-Out performed immediately prior to starting procedure, including verbal and active participation of all team members addressing the following:Yes  * Correct patient identify  * Confirmed that the correct side and site are marked  * An accurate procedure consent form  * Agreement on the procedure to be done  * Correct patient position  * Relevant images and results are properly labeled and appropriately displayed  * The need to administer antibiotics or fluids for irrigation purposes during the procedure as applicable   * Safety precautions based on patient history or medication use    DURING PROCEDURE: Verification of correct person, site, and procedures any time the responsibility for care of the patient is transferred to another member of the care team.       The following medications were given:         Prior to injection, verified patient identity using patient's name and date of birth.  Due to injection administration,  patient instructed to remain in clinic for 15 minutes  afterwards, and to report any adverse reaction to me immediately.    Right wrist scar injection  Medication Name: Kenalog 40mg/ml NDC [address]-8  Drug Amount Wasted:  Yes: 20 mg/ml   Vial/Syringe: Single dose vial  Expiration Date:  3/1/26    Medication Name Lidocaine 1% NDC 60505-206-89    Scribed by Lawanda Mayorga ATC for Dr. Fung on February 4, 2025 at 10:43am based on the provider's statements to me.     Lawanda Mayorga ATC      Again, thank you for allowing me to participate in the care of your patient.        Sincerely,        Kt Fung MD    Electronically signed

## 2025-02-04 NOTE — NURSING NOTE
Reason For Visit:   Chief Complaint   Patient presents with    Consult     Scar revision surgery consult, right distal third of forearm  DOS:5/7/2024 Dr. Brian Cerna MD: Clinic, [facility]  Ref. MD: Est    Age: 39 year old    ?  No      There were no vitals taken for this visit.      Pain Assessment  Patient Currently in Pain: Yes  0-10 Pain Scale: 3  Primary Pain Location: Arm (right forearm/wrist)    Hand Dominance Evaluation  Hand Dominance: Right          QuickDASH Assessment      2/4/2025     8:42 AM   QuickDASH Main   1. Open a tight or new jar Mild difficulty   2. Do heavy household chores (e.g., wash walls, floors) Mild difficulty   3. Carry a shopping bag or briefcase Mild difficulty   4. Wash your back No difficulty          Current Outpatient Medications   Medication Sig Dispense Refill    dexAMETHasone (DECADRON) 4 MG/ML injection Apply 1.5-2 mLs (6-8 mg) topically See Admin Instructions. Use 4-8 mg for iontophoresis for up to 3 applications 6 mL 1    ibuprofen (ADVIL/MOTRIN) 600 MG tablet Take 1 tablet (600 mg) by mouth every 6 hours as needed 30 tablet 0    sertraline (ZOLOFT) 100 MG tablet Take 100 mg by mouth daily         No Known Allergies    Jasmin Andujar, ATC

## 2025-02-04 NOTE — PROGRESS NOTES
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  Patient initially was bitten by a cat on 5/4/24, has had a soft tissue distal dorsal forearm washout on 5 and appropriate antibiotic treatment, no indication of arthrotomy or joint space infection at review of operative notes.  Since the patient has been examined working on right wrist extension which has been noted to be limited to 60 degrees on active motion, working with OT hand therapist for the past 6 months.  Pain at her lateral epicondyle is dull, achy and worse with activity, made better with rest. She describes no sensory changes or deficits.  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       ROS: Negative, see HPI  PMH: Nondiabetic  PSH: Right dorsal distal third forearm radial washout on 5/7 with Dr. Torres  Medications: No blood thinners  Allergies: None  SH: Nonsmoker, denies any tobacco or nicotine use  FH: No bleeding or clotting issues, or problems with anesthesia     Examination:  Nonlabored breathing  Not distressed  Hypertrophic raised vertical 3 cm scar along the right dorsal radial wrist, tender to palpation, no bony tenderness at the wrist, mild tenderness at the lateral epicondylar complex, active extension of the right wrist limited to 60 degrees, full passive extension.     XR right elbow: No radiopaque mass or foreign body.  No fractures or dislocations.     A/P: 39-year-old female right-hand-dominant non-smoker presenting with a hypertrophic scar along her right dorsal radial wrist as well as tenderness along the lateral elbow epicondyle complex and extensor retinaculum.    -Discussed options for management for her  hypertrophic scar.  Given evidence of redness, raised, and tender nature of scar, it would be reasonable to inject steroid into this region to provide relief and promote further scar remodeling. We can plan for several serial intralesional steroid injections to help her symptoms.  We discussed risks and benefits of injecting steroid including hypopigmentation and fat atrophy.  Despite these risks, patient consented to the procedure.  Injected 1.5 cc of 1% lidocaine and 20 mg Kenalog 1:1.  Patient tolerated the procedure well.  We also discussed continued scar massage as well as scar treatment for scar management.  -Discussed conservative management of intermittent tenderness of her lateral elbow epicondyle complex as well as extensor mechanism, including rest, activity modification in general, nd NSAID medications.  - All questions answered.   - Patient will return to clinic in approximately 4 weeks

## 2025-02-04 NOTE — PROGRESS NOTES
Hand / Upper Extremity Injection/Arthrocentesis    Date/Time: 2025 10:40 AM    Performed by: Kt Fung MD  Authorized by: Kt Fung MD    Indications:  Pain  Needle Size:  27 G  Guidance: landmark     Condition comment:  Right dorsal wrist scar injection    Medications:  20 mg triamcinolone 40 MG/ML; 0.5 mL lidocaine (PF) 1 %  Outcome:  Tolerated well, no immediate complications  Procedure discussed: discussed risks, benefits, and alternatives    Consent Given by:  Patient  Timeout: timeout called immediately prior to procedure    Prep: patient was prepped and draped in usual sterile fashion        [facility] [address] [id number] [phone]  Dept: [phone]  ______________________________________________________________________________    Patient: Benita Harris   : 1985   MRN: [id number]   2025    INVASIVE PROCEDURE SAFETY CHECKLIST    Date: 2025   Procedure: Right dorsal wrist scar steroid injection  Patient Name: Benita Harris  MRN: [id number]  YOB: 1985    Action: Complete sections as appropriate. Any discrepancy results in a HARD COPY until resolved.     PRE PROCEDURE:  Patient ID verified with 2 identifiers (name and  or MRN): Yes  Procedure and site verified with patient/designee (when able): Yes  Accurate consent documentation in medical record: Yes  H&P (or appropriate assessment) documented in medical record: Yes  H&P must be up to 20 days prior to procedure and updates within 24 hours of procedure as applicable: Yes  Relevant diagnostic and radiology test results appropriately labeled and displayed as applicable: NA  Procedure site(s) marked with provider initials: NA    TIMEOUT:  Time-Out performed immediately prior to starting procedure, including verbal and active participation of all team members addressing the following:Yes  * Correct patient identify  *  Confirmed that the correct side and site are marked  * An accurate procedure consent form  * Agreement on the procedure to be done  * Correct patient position  * Relevant images and results are properly labeled and appropriately displayed  * The need to administer antibiotics or fluids for irrigation purposes during the procedure as applicable   * Safety precautions based on patient history or medication use    DURING PROCEDURE: Verification of correct person, site, and procedures any time the responsibility for care of the patient is transferred to another member of the care team.       The following medications were given:         Prior to injection, verified patient identity using patient's name and date of birth.  Due to injection administration, patient instructed to remain in clinic for 15 minutes  afterwards, and to report any adverse reaction to me immediately.    Right wrist scar injection  Medication Name: Kenalog 40mg/ml NDC [address]-6  Drug Amount Wasted:  Yes: 20 mg/ml   Vial/Syringe: Single dose vial  Expiration Date:  3/1/26    Medication Name Lidocaine 1% NDC 00766-392-11    Scribed by Lawanda Mayorga ATC for Dr. Fung on February 4, 2025 at 10:43am based on the provider's statements to me.     Lawanda Mayorga ATC

## 2025-02-05 RX ORDER — LIDOCAINE HYDROCHLORIDE 10 MG/ML
0.5 INJECTION, SOLUTION EPIDURAL; INFILTRATION; INTRACAUDAL; PERINEURAL
Status: COMPLETED | OUTPATIENT
Start: 2025-02-04 | End: 2025-02-04

## 2025-02-05 RX ORDER — TRIAMCINOLONE ACETONIDE 40 MG/ML
20 INJECTION, SUSPENSION INTRA-ARTICULAR; INTRAMUSCULAR
Status: COMPLETED | OUTPATIENT
Start: 2025-02-04 | End: 2025-02-04

## 2025-02-17 ENCOUNTER — THERAPY VISIT (OUTPATIENT)
Dept: OCCUPATIONAL THERAPY | Facility: CLINIC | Age: 40
End: 2025-02-17
Payer: COMMERCIAL

## 2025-02-17 DIAGNOSIS — M25.531 RIGHT WRIST PAIN: Primary | ICD-10-CM

## 2025-02-17 PROCEDURE — 97140 MANUAL THERAPY 1/> REGIONS: CPT | Mod: GO | Performed by: OCCUPATIONAL THERAPIST

## 2025-03-10 ENCOUNTER — THERAPY VISIT (OUTPATIENT)
Dept: OCCUPATIONAL THERAPY | Facility: CLINIC | Age: 40
End: 2025-03-10
Payer: COMMERCIAL

## 2025-03-10 DIAGNOSIS — M25.531 RIGHT WRIST PAIN: Primary | ICD-10-CM

## 2025-03-10 PROCEDURE — 97140 MANUAL THERAPY 1/> REGIONS: CPT | Mod: GO | Performed by: OCCUPATIONAL THERAPIST

## 2025-03-18 ENCOUNTER — OFFICE VISIT (OUTPATIENT)
Dept: ORTHOPEDICS | Facility: CLINIC | Age: 40
End: 2025-03-18
Payer: COMMERCIAL

## 2025-03-18 DIAGNOSIS — Z98.890 STATUS POST SURGERY: Primary | ICD-10-CM

## 2025-03-18 PROCEDURE — 1125F AMNT PAIN NOTED PAIN PRSNT: CPT | Performed by: STUDENT IN AN ORGANIZED HEALTH CARE EDUCATION/TRAINING PROGRAM

## 2025-03-18 PROCEDURE — 99213 OFFICE O/P EST LOW 20 MIN: CPT | Performed by: STUDENT IN AN ORGANIZED HEALTH CARE EDUCATION/TRAINING PROGRAM

## 2025-03-18 NOTE — LETTER
3/18/2025      Benita Harris  [address]  [place] [address]      Dear Colleague,    Thank you for referring your patient, Benita Harris, to the [facility] [place]. Please see a copy of my visit note below.    Ortho Hand    Patient returns following a scar intralesional steroid injection.  She states that she thinks that the injection has helped both improve the tenderness and even flatten the scar slightly.  She is not that bothered by the scar.    On exam, right distal volar forearm scar that is hypertrophic, soft, nontender.  Right wrist active extension limited by 15-20 degrees relative to the contralateral side.    A/P: 39-year-old female presenting with right distal volar forearm hypertrophic scar    -Since the scar is not as symptomatic following the steroid injection and the patient is not aesthetically bothered by the scar at this time, would not be unreasonable to continue expectant management with no additional intervention at this time.  Explained that an additional steroid injection at 10 months after surgery is unlikely to modulate the scar remodeling very much.  Patient will return to clinic if there is a desire for a scar revision.  All questions answered.  -A total of 20 minutes was devoted to review of chart, direct face-to-face patient counseling and documentation during and on the day of this encounter, exclusive of any procedure performed.    Kt Fung MD, PhD, FACS      Again, thank you for allowing me to participate in the care of your patient.        Sincerely,        Kt Fung MD    Electronically signed Writer RN called and spoke to the patient letting her know that her 4/21/20   1030 appointment with Dr. Vela is being cancelled secondary to the quarantine COVID 19 and we will be rescheduling when clinic opens up.

## 2025-03-18 NOTE — NURSING NOTE
Reason For Visit:   Chief Complaint   Patient presents with    Surgical Followup     Follow-up Scar revision right distal third of forearm  DOS:5/7/2024 Dr. Brian Cerna MD: [facility]  Ref. MD: Est    Age: 39 year old    ?  No      There were no vitals taken for this visit.      Pain Assessment  Patient Currently in Pain: Yes  0-10 Pain Scale: 2 (By the end of the day can get up to 7/10)  Primary Pain Location: Arm (Right forearm and elbow)  Pain Descriptors: Constant, Shooting    Hand Dominance Evaluation  Hand Dominance: Right          QuickDASH Assessment      3/18/2025     8:57 AM   QuickDASH Main   1. Open a tight or new jar Moderate difficulty   2. Do heavy household chores (e.g., wash walls, floors) Moderate difficulty   3. Carry a shopping bag or briefcase Mild difficulty   4. Wash your back Mild difficulty   5. Use a knife to cut food No difficulty   6. Recreational activities in which you take some force or impact through your arm, shoulder or hand (e.g., golf, hammering, tennis, etc.) Moderate difficulty   7. During the past week, to what extent has your arm, shoulder or hand problem interfered with your normal social activities with family, friends, neighbours or groups Slightly   8. During the past week, were you limited in your work or other regular daily activities as a result of your arm, shoulder or hand problem Slightly limited   9. Arm, shoulder or hand pain Moderate   10.Tingling (pins and needles) in your arm,shoulder or hand None   11. During the past week, how much difficulty have you had sleeping because of the pain in your arm, shoulder or hand No difficulty   Quickdash Ability Score 27.27          Current Outpatient Medications   Medication Sig Dispense Refill    dexAMETHasone (DECADRON) 4 MG/ML injection Apply 1.5-2 mLs (6-8 mg) topically See Admin Instructions. Use 4-8 mg for iontophoresis for up to 3 applications 6 mL 1    ibuprofen  (ADVIL/MOTRIN) 600 MG tablet Take 1 tablet (600 mg) by mouth every 6 hours as needed 30 tablet 0    sertraline (ZOLOFT) 100 MG tablet Take 100 mg by mouth daily         No Known Allergies    GLEN LIMON, ATC

## 2025-03-18 NOTE — PROGRESS NOTES
Ortho Hand    Patient returns following a scar intralesional steroid injection.  She states that she thinks that the injection has helped both improve the tenderness and even flatten the scar slightly.  She is not that bothered by the scar.    On exam, right distal volar forearm scar that is hypertrophic, soft, nontender.  Right wrist active extension limited by 15-20 degrees relative to the contralateral side.    A/P: 39-year-old female presenting with right distal volar forearm hypertrophic scar    -Since the scar is not as symptomatic following the steroid injection and the patient is not aesthetically bothered by the scar at this time, would not be unreasonable to continue expectant management with no additional intervention at this time.  Explained that an additional steroid injection at 10 months after surgery is unlikely to modulate the scar remodeling very much.  Patient will return to clinic if there is a desire for a scar revision.  All questions answered.  -A total of 20 minutes was devoted to review of chart, direct face-to-face patient counseling and documentation during and on the day of this encounter, exclusive of any procedure performed.    Kt Fung MD, PhD, FACS

## 2025-04-14 ENCOUNTER — THERAPY VISIT (OUTPATIENT)
Dept: OCCUPATIONAL THERAPY | Facility: CLINIC | Age: 40
End: 2025-04-14
Payer: COMMERCIAL

## 2025-04-14 DIAGNOSIS — M25.531 RIGHT WRIST PAIN: Primary | ICD-10-CM

## 2025-04-14 PROCEDURE — 97035 APP MDLTY 1+ULTRASOUND EA 15: CPT | Mod: GO | Performed by: OCCUPATIONAL THERAPIST

## 2025-04-14 PROCEDURE — 97140 MANUAL THERAPY 1/> REGIONS: CPT | Mod: GO | Performed by: OCCUPATIONAL THERAPIST

## 2025-05-12 ENCOUNTER — THERAPY VISIT (OUTPATIENT)
Dept: OCCUPATIONAL THERAPY | Facility: CLINIC | Age: 40
End: 2025-05-12
Payer: COMMERCIAL

## 2025-05-12 DIAGNOSIS — M25.531 RIGHT WRIST PAIN: Primary | ICD-10-CM

## 2025-05-12 PROCEDURE — 97110 THERAPEUTIC EXERCISES: CPT | Mod: GO | Performed by: OCCUPATIONAL THERAPIST

## 2025-05-12 NOTE — PROGRESS NOTES
HAND THERAPY NOTE    Date: 5/12/2025  Please refer to the daily flowsheet for treatment today, total treatment time and time spent performing 1:1 timed codes.       Diagnosis: R wrist pain, hypertrophic scar s/p cat bite; R lateral elbow pain     Precautions: none  Patient is Right hand dominant     Procedure: Right upper extremity irrigation and debridement of the distal third of the forearm   DOS: 5/6/24  DOI 5/4/24    Therapy start date: 7/8/2024  Latest therapy visit 5/12/25  Number of visits: 26    OBJECTIVE:    PAIN:  Location: dorsal R wrist/distal forearm, and right lateral elbow   Note: the R lateral elbow pain can vary, it is just posterior to the lateral epicondyle today, and has also been at the R lateral epicondyle, just distal and into the right forearm extensor wad.  Pain level at rest: none to mild. R wrist has some persistent mild sensitivity to the bite/scar area  Pain level with use: none to mild, at times moderate    ROM:   Wrist ROM  7/8/24  Left AROM   Right AROM  5/12/25  L   R   Extension 82 55 80 AROM  85 PROM 55 AROM  PROM 75   Flexion 53 31 tightness mainly 75 66   Radial Deviation (RD) WNL WFL 18 10   Ulnar Deviation (UD) WNL WFL 55 55   Supination 70 feels tighter 89 WNL WNL   Pronation WNL WNL WNL WNL     Appearance of Scar   right  7/8/24  right   5/12/2025     Location R dorsal distal forearm/wrist    Length 3 cm 2.7 cm   height ~1-2mm 0-1 mm   width 0.2 to .5cm varies from 0.2 proximally to .5 cm distally   Sensitivity  Minimal tenderness to palpation, except right over and near the dorsal incision scar. Scar sensitivity and tenderness is mild, depending on the texture and activity performed   Color red Red, pink (shade can vary somewhat)  Proximal end has become much lighter   sensation Some tingling to the area at times Mild sensitivity, without overt numbness and tingling   drainage None, well closed      4/14/25 and 5/12/25   Appearance indicates there may be some adhered  tissues, especially proximal end of the scar, in comparison to the L dorsal wrist.  This has remained essentially unchanged during the course of therapy.      STRENGTH   test (pounds)   7/8/24 5/12/25     L R L R    test in standard position of testing, elbows at side 51 32 40 36      with shoulder in 90 flexion and elbow extended   37  35  (felt it to just posterior to R lateral epicondyle, also volar distal ulnar forearm)       SUMMARY:  Overview: Patient has had a total of 26 hand therapy visits over the course of about 11 months.  The presenting condition was related to animal bite injury, which required surgical irrigation and debridement.  Patient was very engaged throughout the course of hand therapy.  She consistently performed her home program and demonstrated good understanding of exercises and self-management strategies for home.    Therapy course: Therapy treatment utilized modalities including ultrasound and iontophoresis with dexamethasone directly over the surgical scar, to work to address scar discomfort and sensitivity, hypertrophy as well as adherence.  Other interventions included mobilization and massage during therapy sessions, and patient performed scar massage regularly at home.  Lotion and/or silicone sheeting was utilized often.  Patient tolerated stretching very well in clinic and also performed stretching of the soft tissues on her own at home on a regular basis.  Therapy also involved some light strengthening of the affected upper extremity, which was well-tolerated. Strengthening was carefully prescribed based on clinical assessment.    Response to treatment: The patient was able to improve her overall arc of wrist range of motion.  Although scar adherence and sensitivity have decreased compared to July 2024,, these qualities are still at a point where they are disruptive, especially given that this is the patient's dominant hand.  Despite best efforts, she still presents  with active right wrist range of motion that is well below what is expected, and well below her baseline.  Wrist extension is essential to holding, handling, and manipulating objects.  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  Patient is able to perform work duties, but continues to need to ask for assistance at times and experiences pain and fatigue that is certainly more than her baseline function.  Secondary to the original injury, she has also developed right lateral elbow pain.  Patient has not been able to achieve the goal of consistent pain-free function of the right wrist in all work tasks.    Plan: At this time the patient has engaged fully in a lengthy course of comprehensive hand therapy.  It appears that she has essentially reached the benefit of what can be offered at this time.  However, if there are changes, complications or concerns and questions, patient is more than welcome to return.      Respectfully submitted,    Lorri Kerr MS, OTR/L, CHT  Certified Hand Therapist    [facility]  [address] [place] [address], [address] [address]    Email: [email or web address]   Phone / Voicemail: [phone]  Fax: [phone]

## (undated) DEVICE — GLOVE BIOGEL PI MICRO INDICATOR UNDERGLOVE SZ 8.0 48980

## (undated) DEVICE — GOWN XLG DISP 9545

## (undated) DEVICE — TUBING IRRIG CYSTO/BLADDER SET 81" LF 2C4040

## (undated) DEVICE — SOL NACL 0.9% IRRIG 1000ML BOTTLE 2F7124

## (undated) DEVICE — SU ETHILON 3-0 PS-2 18" 1669H

## (undated) DEVICE — DRSG GAUZE 4X8" NON21842

## (undated) DEVICE — TOURNIQUET CUFF 18" REPRO RED 60-7070-103

## (undated) DEVICE — BLADE KNIFE SURG 10 371110

## (undated) DEVICE — CAST PLASTER SPLINT 3X15" 7393

## (undated) DEVICE — COVER CAMERA IN-LIGHT DISP LT-C02

## (undated) DEVICE — LINEN ORTHO PACK 5446

## (undated) DEVICE — PREP CHLORAPREP 26ML TINTED HI-LITE ORANGE 930815

## (undated) DEVICE — SPECIMEN CONTAINER 5OZ STERILE 2600SA

## (undated) DEVICE — BNDG ELASTIC 4"X5YDS UNSTERILE 6611-40

## (undated) DEVICE — ESU GROUND PAD ADULT W/CORD E7507

## (undated) DEVICE — CAST PADDING 4" UNSTERILE 9044

## (undated) DEVICE — Device

## (undated) DEVICE — SUCTION MANIFOLD NEPTUNE 2 SYS 4 PORT 0702-020-000

## (undated) DEVICE — CAST PADDING 4" STERILE 9044S

## (undated) DEVICE — SOL WATER IRRIG 1000ML BOTTLE 2F7114

## (undated) DEVICE — DRSG ADAPTIC 3X8" 6113

## (undated) DEVICE — STRAP KNEE/BODY 31143004

## (undated) DEVICE — GLOVE BIOGEL PI ULTRATOUCH G SZ 7.5 42175

## (undated) RX ORDER — TRIAMCINOLONE ACETONIDE 40 MG/ML
INJECTION, SUSPENSION INTRA-ARTICULAR; INTRAMUSCULAR
Status: DISPENSED
Start: 2025-02-04

## (undated) RX ORDER — LIDOCAINE HYDROCHLORIDE 10 MG/ML
INJECTION, SOLUTION EPIDURAL; INFILTRATION; INTRACAUDAL; PERINEURAL
Status: DISPENSED
Start: 2025-02-04

## (undated) RX ORDER — BUPIVACAINE HYDROCHLORIDE 2.5 MG/ML
INJECTION, SOLUTION EPIDURAL; INFILTRATION; INTRACAUDAL
Status: DISPENSED
Start: 2024-05-07

## (undated) RX ORDER — ONDANSETRON 2 MG/ML
INJECTION INTRAMUSCULAR; INTRAVENOUS
Status: DISPENSED
Start: 2024-05-07

## (undated) RX ORDER — DEXAMETHASONE SODIUM PHOSPHATE 4 MG/ML
INJECTION, SOLUTION INTRA-ARTICULAR; INTRALESIONAL; INTRAMUSCULAR; INTRAVENOUS; SOFT TISSUE
Status: DISPENSED
Start: 2024-05-07

## (undated) RX ORDER — PROPOFOL 10 MG/ML
INJECTION, EMULSION INTRAVENOUS
Status: DISPENSED
Start: 2024-05-07

## (undated) RX ORDER — FENTANYL CITRATE 50 UG/ML
INJECTION, SOLUTION INTRAMUSCULAR; INTRAVENOUS
Status: DISPENSED
Start: 2024-05-07